# Patient Record
Sex: FEMALE | Race: BLACK OR AFRICAN AMERICAN | Employment: FULL TIME | URBAN - METROPOLITAN AREA
[De-identification: names, ages, dates, MRNs, and addresses within clinical notes are randomized per-mention and may not be internally consistent; named-entity substitution may affect disease eponyms.]

---

## 2017-02-08 ENCOUNTER — OFFICE VISIT (OUTPATIENT)
Dept: FAMILY MEDICINE CLINIC | Age: 32
End: 2017-02-08

## 2017-02-08 VITALS
WEIGHT: 272 LBS | OXYGEN SATURATION: 98 % | TEMPERATURE: 97.9 F | RESPIRATION RATE: 16 BRPM | BODY MASS INDEX: 46.44 KG/M2 | SYSTOLIC BLOOD PRESSURE: 150 MMHG | HEIGHT: 64 IN | DIASTOLIC BLOOD PRESSURE: 92 MMHG

## 2017-02-08 DIAGNOSIS — E04.9 GOITER: ICD-10-CM

## 2017-02-08 DIAGNOSIS — I10 ESSENTIAL HYPERTENSION: ICD-10-CM

## 2017-02-08 DIAGNOSIS — R20.2 NUMBNESS AND TINGLING IN RIGHT HAND: ICD-10-CM

## 2017-02-08 DIAGNOSIS — R73.01 IFG (IMPAIRED FASTING GLUCOSE): ICD-10-CM

## 2017-02-08 DIAGNOSIS — E66.01 MORBID OBESITY, UNSPECIFIED OBESITY TYPE (HCC): Primary | ICD-10-CM

## 2017-02-08 DIAGNOSIS — R20.0 NUMBNESS AND TINGLING IN RIGHT HAND: ICD-10-CM

## 2017-02-08 DIAGNOSIS — E66.01 MORBID OBESITY, UNSPECIFIED OBESITY TYPE (HCC): ICD-10-CM

## 2017-02-08 DIAGNOSIS — M54.2 NECK PAIN: ICD-10-CM

## 2017-02-08 DIAGNOSIS — F41.9 ANXIETY: ICD-10-CM

## 2017-02-08 RX ORDER — LORAZEPAM 0.5 MG/1
0.5 TABLET ORAL
Qty: 15 TAB | Refills: 0 | Status: SHIPPED | OUTPATIENT
Start: 2017-02-08 | End: 2017-05-10 | Stop reason: SDUPTHER

## 2017-02-08 NOTE — PROGRESS NOTES
Patient is here to re-establish care. She is having trouble sleeping.  She also states she has blurry vision

## 2017-02-08 NOTE — PROGRESS NOTES
Talita Bone is a 36 y.o.  female and presents with     Chief Complaint   Patient presents with    Numbness    Obesity    Hypertension    Anxiety     Pt delivered 2 years back . Pt works as . Pt has been having numbness in her rt hand that goes all the way upto her neck. No neck injuries. Pt had HTN during pregnancy and was concerned as high risk. Pt does have anxiety and is unable to sleep at night. Pt is not breast feeding/  Pt does gets headaches. Pt does feel nauseous and the light bothers her. No past medical history on file. Past Surgical History   Procedure Laterality Date    Hx other surgical  2012     right lung thotocentesis    Hx  section       2 done. one in  another in      Current Outpatient Prescriptions   Medication Sig    LORazepam (ATIVAN) 0.5 mg tablet Take 1 Tab by mouth nightly. Max Daily Amount: 0.5 mg.  promethazine (PHENERGAN) 25 mg tablet Take 25 mg by mouth every six (6) hours as needed for Nausea.  prenatal multivit-ca-min-fe-fa tab Take  by mouth. No current facility-administered medications for this visit. Health Maintenance   Topic Date Due    DTaP/Tdap/Td series (1 - Tdap) 2008    PAP AKA CERVICAL CYTOLOGY  2008    INFLUENZA AGE 9 TO ADULT  2016     Immunization History   Administered Date(s) Administered    TB Skin Test (PPD) Intradermal 2014     No LMP recorded. Allergies and Intolerances:   No Known Allergies    Family History:   Family History   Problem Relation Age of Onset    Hypertension Mother     Asthma Maternal Aunt     Hypertension Maternal Grandmother     Stroke Maternal Grandmother        Social History:   She  reports that she has never smoked. She does not have any smokeless tobacco history on file. She  reports that she drinks alcohol.             Review of Systems:   General: negative for - chills, fatigue, fever, weight change  Psych: negative for - anxiety, depression, irritability or mood swings  ENT: negative for - headaches, hearing change, nasal congestion, oral lesions, sneezing or sore throat  Heme/ Lymph: negative for - bleeding problems, bruising, pallor or swollen lymph nodes  Endo: negative for - hot flashes, polydipsia/polyuria or temperature intolerance  Resp: negative for - cough, shortness of breath or wheezing  CV: negative for - chest pain, edema or palpitations  GI: negative for - abdominal pain, change in bowel habits, constipation, diarrhea or nausea/vomiting  : negative for - dysuria, hematuria, incontinence, pelvic pain or vulvar/vaginal symptoms  MSK: negative for - joint pain, joint swelling or muscle pain  Neuro: negative for - confusion, headaches, seizures or weakness  Derm: negative for - dry skin, hair changes, rash or skin lesion changes          Physical:   Vitals:   Vitals:    02/08/17 1554   BP: (!) 150/92   Resp: 16   Temp: 97.9 °F (36.6 °C)   TempSrc: Oral   SpO2: 98%   Weight: 272 lb (123.4 kg)   Height: 5' 4\" (1.626 m)           Exam:   HEENT- atraumatic,normocephalic, awake, oriented, well nourished,obese  Neck - supple,no enlarged lymph nodes, no JVD, mild thyromegaly  Chest- CTA, no rhonchi, no crackles  Heart- rrr, no murmurs / gallop/rub  Abdomen- soft,BS+,NT, no hepatosplenomegaly  Ext - no c/c/edema ,phalens sign pos rt side  Neuro- no focal deficits. Power 5/5 all extremities  Skin - warm,dry, no obvious rashes.           Review of Data:   LABS:   Lab Results   Component Value Date/Time    WBC 10.3 05/10/2014 08:50 PM    HGB 12.0 05/10/2014 08:50 PM    HCT 36.8 05/10/2014 08:50 PM    PLATELET 262 84/02/2929 08:50 PM     Lab Results   Component Value Date/Time    Sodium 137 05/10/2014 08:50 PM    Potassium 3.9 05/10/2014 08:50 PM    Chloride 106 05/10/2014 08:50 PM    CO2 24 05/10/2014 08:50 PM    Glucose 94 05/10/2014 08:50 PM    BUN 10 05/10/2014 08:50 PM    Creatinine 0.82 05/10/2014 08:50 PM     Lab Results Component Value Date/Time    Cholesterol, total 161 08/26/2010 03:16 PM    HDL Cholesterol 48 08/26/2010 03:16 PM    LDL, calculated 84 08/26/2010 03:16 PM    Triglyceride 145 08/26/2010 03:16 PM     No results found for: GPT        Impression / Plan:        ICD-10-CM ICD-9-CM    1. Morbid obesity, unspecified obesity type ([facility] W31.51 390.24 METABOLIC PANEL, COMPREHENSIVE      LIPID PANEL      CBC WITH AUTOMATED DIFF   2. Neck pain M54.2 723.1 XR SPINE CERV PA LAT ODONT 3 V MAX      URIC ACID   3. Essential hypertension I10 401.9    4. Goiter E04.9 240.9 TSH 3RD GENERATION   5. Numbness and tingling in right hand R20.2 782.0    6. Anxiety F41.9 300.00 LORazepam (ATIVAN) 0.5 mg tablet   7. IFG (impaired fasting glucose) R73.01 790.21 HEMOGLOBIN A1C WITH EAG      METABOLIC PANEL, COMPREHENSIVE         Explained to patient risk benefits of the medications. Advised patient to stop meds if having any side effects. Pt verbalized understanding of the instructions. I have discussed the diagnosis with the patient and the intended plan as seen in the above orders. The patient has received an after-visit summary and questions were answered concerning future plans. I have discussed medication side effects and warnings with the patient as well. I have reviewed the plan of care with the patient, accepted their input and they are in agreement with the treatment goals. Reviewed plan of care. Patient has provided input and agrees with goals.     Follow-up Disposition: Not on Clyde Bond MD

## 2017-02-08 NOTE — MR AVS SNAPSHOT
Visit Information Date & Time Provider Department Dept. Phone Encounter #  
 2/8/2017  3:45 PM Juan M Christensen, [address] [facility]  Follow-up Instructions Return in about 2 weeks (around 2/22/2017). Upcoming Health Maintenance Date Due DTaP/Tdap/Td series (1 - Tdap) 11/23/2008 PAP AKA CERVICAL CYTOLOGY 11/23/2008 INFLUENZA AGE 9 TO ADULT 8/1/2016 Allergies as of 2/8/2017  Review Complete On: 2/8/2017 By: Juan M Christensen MD  
 No Known Allergies Current Immunizations  Reviewed on 3/31/2014 Name Date  
 TB Skin Test (PPD) Intradermal 3/28/2014 Not reviewed this visit You Were Diagnosed With   
  
 Codes Comments Morbid obesity, unspecified obesity type ([facility]    -  Primary ICD-10-CM: E66.01 
ICD-9-CM: 278.01 Neck pain     ICD-10-CM: M54.2 ICD-9-CM: 723.1 Essential hypertension     ICD-10-CM: I10 
ICD-9-CM: 401.9 Goiter     ICD-10-CM: E04.9 ICD-9-CM: 240.9 Numbness and tingling in right hand     ICD-10-CM: R20.2 ICD-9-CM: 782.0 Anxiety     ICD-10-CM: F41.9 ICD-9-CM: 300.00 IFG (impaired fasting glucose)     ICD-10-CM: R73.01 
ICD-9-CM: 790.21 Vitals BP Temp Resp Height(growth percentile) Weight(growth percentile) SpO2  
 (!) 150/92 97.9 °F (36.6 °C) (Oral) 16 5' 4\" (1.626 m) 272 lb (123.4 kg) 98% BMI OB Status Smoking Status 46.69 kg/m2 Having regular periods Never Smoker Vitals History BMI and BSA Data Body Mass Index Body Surface Area  
 46.69 kg/m 2 2.36 m 2 Preferred Pharmacy Pharmacy Name Phone [id number] [address], [address] [place] [address] [phone] Your Updated Medication List  
  
   
This list is accurate as of: 2/8/17  4:19 PM.  Always use your most recent med list.  
  
  
  
  
 LORazepam 0.5 mg tablet Commonly known as:  ATIVAN Take 1 Tab by mouth nightly.  Max Daily Amount: 0.5 mg.  
  
 prenatal multivit-ca-min-fe-fa Tab Take  by mouth.  
  
 promethazine 25 mg tablet Commonly known as:  PHENERGAN Take 25 mg by mouth every six (6) hours as needed for Nausea. Prescriptions Printed Refills LORazepam (ATIVAN) 0.5 mg tablet 0 Sig: Take 1 Tab by mouth nightly. Max Daily Amount: 0.5 mg.  
 Class: Print Route: Oral  
  
Follow-up Instructions Return in about 2 weeks (around 2/22/2017). To-Do List   
 02/08/2017 Lab:  CBC WITH AUTOMATED DIFF   
  
 02/08/2017 Lab:  HEMOGLOBIN A1C WITH EAG   
  
 02/08/2017 Lab:  LIPID PANEL   
  
 02/08/2017 Lab:  METABOLIC PANEL, COMPREHENSIVE   
  
 02/08/2017 Lab:  TSH 3RD GENERATION   
  
 02/08/2017 Lab:  URIC ACID   
  
 02/08/2017 Imaging:  XR SPINE CERV PA LAT ODONT 3 V MAX Introducing [facility] & HEALTH SERVICES! Ellis Bazzi introduces SPEEDELO patient portal. Now you can access parts of your medical record, email your doctor's office, and request medication refills online. 1. In your internet browser, go to [email or web address] 2. Click on the First Time User? Click Here link in the Sign In box. You will see the New Member Sign Up page. 3. Enter your SPEEDELO Access Code exactly as it appears below. You will not need to use this code after youve completed the sign-up process. If you do not sign up before the expiration date, you must request a new code. · SPEEDELO Access Code: [email or web address] Expires: 5/9/2017  4:19 PM 
 
4. Enter the last four digits of your Social Security Number (xxxx) and Date of Birth (mm/dd/yyyy) as indicated and click Submit. You will be taken to the next sign-up page. 5. Create a SPEEDELO ID. This will be your SPEEDELO login ID and cannot be changed, so think of one that is secure and easy to remember. 6. Create a YOGITECHt password. You can change your password at any time. 7. Enter your Password Reset Question and Answer. This can be used at a later time if you forget your password. 8. Enter your e-mail address. You will receive e-mail notification when new information is available in [address] 9. Click Sign Up. You can now view and download portions of your medical record. 10. Click the Download Summary menu link to download a portable copy of your medical information. If you have questions, please visit the Frequently Asked Questions section of the Applitools website. Remember, Applitools is NOT to be used for urgent needs. For medical emergencies, dial 911. Now available from your iPhone and Android! Please provide this summary of care documentation to your next provider. Your primary care clinician is listed as Talha Bates. If you have any questions after today's visit, please call [phone].

## 2017-02-17 ENCOUNTER — HOSPITAL ENCOUNTER (OUTPATIENT)
Dept: LAB | Age: 32
Discharge: HOME | End: 2017-02-17

## 2017-02-17 PROCEDURE — 99001 SPECIMEN HANDLING PT-LAB: CPT | Performed by: INTERNAL MEDICINE

## 2017-02-18 LAB
ALBUMIN SERPL-MCNC: 4.2 G/DL (ref 3.5–5.5)
ALBUMIN/GLOB SERPL: 1.4 {RATIO} (ref 1.1–2.5)
ALP SERPL-CCNC: 69 IU/L (ref 39–117)
ALT SERPL-CCNC: 17 IU/L (ref 0–32)
AST SERPL-CCNC: 16 IU/L (ref 0–40)
BASOPHILS # BLD AUTO: 0 X10E3/UL (ref 0–0.2)
BASOPHILS NFR BLD AUTO: 0 %
BILIRUB SERPL-MCNC: 0.4 MG/DL (ref 0–1.2)
BUN SERPL-MCNC: 8 MG/DL (ref 6–20)
BUN/CREAT SERPL: 11 (ref 8–20)
CALCIUM SERPL-MCNC: 9.3 MG/DL (ref 8.7–10.2)
CHLORIDE SERPL-SCNC: 100 MMOL/L (ref 96–106)
CHOLEST SERPL-MCNC: 152 MG/DL (ref 100–199)
CO2 SERPL-SCNC: 22 MMOL/L (ref 18–29)
CREAT SERPL-MCNC: 0.74 MG/DL (ref 0.57–1)
EOSINOPHIL # BLD AUTO: 0.1 X10E3/UL (ref 0–0.4)
EOSINOPHIL NFR BLD AUTO: 1 %
ERYTHROCYTE [DISTWIDTH] IN BLOOD BY AUTOMATED COUNT: 13.8 % (ref 12.3–15.4)
EST. AVERAGE GLUCOSE BLD GHB EST-MCNC: 120 MG/DL
GLOBULIN SER CALC-MCNC: 3.1 G/DL (ref 1.5–4.5)
GLUCOSE SERPL-MCNC: 81 MG/DL (ref 65–99)
HBA1C MFR BLD: 5.8 % (ref 4.8–5.6)
HCT VFR BLD AUTO: 38.3 % (ref 34–46.6)
HDLC SERPL-MCNC: 43 MG/DL
HGB BLD-MCNC: 12.3 G/DL (ref 11.1–15.9)
IMM GRANULOCYTES # BLD: 0 X10E3/UL (ref 0–0.1)
IMM GRANULOCYTES NFR BLD: 0 %
INTERPRETATION, 910389: NORMAL
LDLC SERPL CALC-MCNC: 95 MG/DL (ref 0–99)
LYMPHOCYTES # BLD AUTO: 2.6 X10E3/UL (ref 0.7–3.1)
LYMPHOCYTES NFR BLD AUTO: 39 %
MCH RBC QN AUTO: 28.1 PG (ref 26.6–33)
MCHC RBC AUTO-ENTMCNC: 32.1 G/DL (ref 31.5–35.7)
MCV RBC AUTO: 88 FL (ref 79–97)
MONOCYTES # BLD AUTO: 0.4 X10E3/UL (ref 0.1–0.9)
MONOCYTES NFR BLD AUTO: 5 %
NEUTROPHILS # BLD AUTO: 3.6 X10E3/UL (ref 1.4–7)
NEUTROPHILS NFR BLD AUTO: 55 %
PLATELET # BLD AUTO: 250 X10E3/UL (ref 150–379)
POTASSIUM SERPL-SCNC: 3.9 MMOL/L (ref 3.5–5.2)
PROT SERPL-MCNC: 7.3 G/DL (ref 6–8.5)
RBC # BLD AUTO: 4.37 X10E6/UL (ref 3.77–5.28)
SODIUM SERPL-SCNC: 142 MMOL/L (ref 134–144)
TRIGL SERPL-MCNC: 71 MG/DL (ref 0–149)
TSH SERPL DL<=0.005 MIU/L-ACNC: 0.96 UIU/ML (ref 0.45–4.5)
URATE SERPL-MCNC: 5.2 MG/DL (ref 2.5–7.1)
VLDLC SERPL CALC-MCNC: 14 MG/DL (ref 5–40)
WBC # BLD AUTO: 6.7 X10E3/UL (ref 3.4–10.8)

## 2017-02-22 ENCOUNTER — HOSPITAL ENCOUNTER (OUTPATIENT)
Dept: GENERAL RADIOLOGY | Age: 32
Discharge: HOME | End: 2017-02-22
Payer: MEDICAID

## 2017-02-22 ENCOUNTER — OFFICE VISIT (OUTPATIENT)
Dept: FAMILY MEDICINE CLINIC | Age: 32
End: 2017-02-22

## 2017-02-22 VITALS
BODY MASS INDEX: 48.49 KG/M2 | SYSTOLIC BLOOD PRESSURE: 129 MMHG | RESPIRATION RATE: 17 BRPM | OXYGEN SATURATION: 98 % | DIASTOLIC BLOOD PRESSURE: 81 MMHG | WEIGHT: 284 LBS | HEIGHT: 64 IN | TEMPERATURE: 97.1 F | HEART RATE: 80 BPM

## 2017-02-22 DIAGNOSIS — R73.01 IFG (IMPAIRED FASTING GLUCOSE): Primary | ICD-10-CM

## 2017-02-22 DIAGNOSIS — R20.0 NUMBNESS AND TINGLING IN RIGHT HAND: ICD-10-CM

## 2017-02-22 DIAGNOSIS — M54.2 NECK PAIN: ICD-10-CM

## 2017-02-22 DIAGNOSIS — E66.01 MORBID OBESITY DUE TO EXCESS CALORIES (HCC): ICD-10-CM

## 2017-02-22 DIAGNOSIS — R20.2 NUMBNESS AND TINGLING IN RIGHT HAND: ICD-10-CM

## 2017-02-22 PROCEDURE — 72040 X-RAY EXAM NECK SPINE 2-3 VW: CPT

## 2017-02-22 RX ORDER — METFORMIN HYDROCHLORIDE 500 MG/1
500 TABLET ORAL 2 TIMES DAILY WITH MEALS
Qty: 60 TAB | Refills: 3 | Status: SHIPPED | OUTPATIENT
Start: 2017-02-22 | End: 2021-02-01

## 2017-02-22 NOTE — MR AVS SNAPSHOT
Visit Information Date & Time Provider Department Dept. Phone Encounter #  
 2/22/2017  1:30 PM Destinee Castillo, [address] [facility] [phone] Follow-up Instructions Return in about 3 months (around 5/22/2017). Upcoming Health Maintenance Date Due  
 PAP AKA CERVICAL CYTOLOGY 11/23/2008 DTaP/Tdap/Td series (2 - Td) 2/22/2027 Allergies as of 2/22/2017  Review Complete On: 2/22/2017 By: Destinee Castillo MD  
 No Known Allergies Current Immunizations  Reviewed on 3/31/2014 Name Date  
 TB Skin Test (PPD) Intradermal 3/28/2014 Not reviewed this visit You Were Diagnosed With   
  
 Codes Comments IFG (impaired fasting glucose)    -  Primary ICD-10-CM: R73.01 
ICD-9-CM: 790.21 Morbid obesity due to excess calories (HCC)     ICD-10-CM: E66.01 
ICD-9-CM: 278.01 Numbness and tingling in right hand     ICD-10-CM: R20.2 ICD-9-CM: 934. 0 Vitals BP  
  
  
  
  
  
 129/81 (BP 1 Location: Left arm, BP Patient Position: Sitting) Vitals History BMI and BSA Data Body Mass Index Body Surface Area 48.75 kg/m 2 2.41 m 2 Preferred Pharmacy Pharmacy Name Phone [id number] [address] [phone] Your Updated Medication List  
  
   
This list is accurate as of: 2/22/17  2:16 PM.  Always use your most recent med list.  
  
  
  
  
 LORazepam 0.5 mg tablet Commonly known as:  ATIVAN Take 1 Tab by mouth nightly. Max Daily Amount: 0.5 mg.  
  
 metFORMIN 500 mg tablet Commonly known as:  GLUCOPHAGE Take 1 Tab by mouth two (2) times daily (with meals). prenatal multivit-ca-min-fe-fa Tab Take  by mouth.  
  
 promethazine 25 mg tablet Commonly known as:  PHENERGAN Take 25 mg by mouth every six (6) hours as needed for Nausea. Prescriptions Sent to Pharmacy  Refills  
 metFORMIN (GLUCOPHAGE) 500 mg tablet 3  
 Sig: Take 1 Tab by mouth two (2) times daily (with meals). Class: Normal  
 Pharmacy: [facility], [address] [place] [address]  #: [phone] Route: Oral  
  
We Performed the Following REFERRAL TO NEUROLOGY [HUM71 Custom] Follow-up Instructions Return in about 3 months (around 5/22/2017). Referral Information Referral ID Referred By Referred To  
  
 [id number] [facility] Not Available Visits Status Start Date End Date 1 New Request 2/22/17 2/22/18 If your referral has a status of pending review or denied, additional information will be sent to support the outcome of this decision. Introducing \[facility]\"" & HEALTH SERVICES! Edmundo Norris introduces CareTree patient portal. Now you can access parts of your medical record, email your doctor's office, and request medication refills online. 1. In your internet browser, go to [email or web address] 2. Click on the First Time User? Click Here link in the Sign In box. You will see the New Member Sign Up page. 3. Enter your CareTree Access Code exactly as it appears below. You will not need to use this code after youve completed the sign-up process. If you do not sign up before the expiration date, you must request a new code. · CareTree Access Code: [email or web address] Expires: 5/9/2017  4:19 PM 
 
4. Enter the last four digits of your Social Security Number (xxxx) and Date of Birth (mm/dd/yyyy) as indicated and click Submit. You will be taken to the next sign-up page. 5. Create a Alohar Mobilet ID. This will be your Alohar Mobilet login ID and cannot be changed, so think of one that is secure and easy to remember. 6. Create a Alohar Mobilet password. You can change your password at any time. 7. Enter your Password Reset Question and Answer. This can be used at a later time if you forget your password. 8. Enter your e-mail address.  You will receive e-mail notification when new information is available in [employer]. 9. Click Sign Up. You can now view and download portions of your medical record. 10. Click the Download Summary menu link to download a portable copy of your medical information. If you have questions, please visit the Frequently Asked Questions section of the [employer] website. Remember, [employer] is NOT to be used for urgent needs. For medical emergencies, dial 911. Now available from your iPhone and Android! Please provide this summary of care documentation to your next provider. Your primary care clinician is listed as Ray Desai. If you have any questions after today's visit, please call [phone].

## 2017-02-22 NOTE — PROGRESS NOTES
Patient presents to clinic for right sided hand numbness. Advance Directive:    1. Do you have an advance directive in place? Patient Reply:     2. If not, would you like material regarding how to put one in place? Patient Reply:      Coordination of Care:    1. Have you been to the ER, urgent care clinic since your last visit? Hospitalized since your last visit? No    2. Have you seen or consulted any other health care providers outside of the [address] [place] [address] since your last visit? Include any pap smears or colon screening. No    Depression Screening completed. Learning Assessment completed. Abuse Screening completed. Health Maintenance reviewed and discussed per provider.

## 2017-02-22 NOTE — PROGRESS NOTES
Nicole Aranda is a 36 y.o.  female and presents with     Chief Complaint   Patient presents with    Numbness    Obesity    Anxiety     Pt continues to have pain in her rt hand and also numbness. Pt is trying to loose wt. Pt took ativan and it helped her with anxiety. No past medical history on file. Past Surgical History:   Procedure Laterality Date    HX  SECTION      2 done. one in  another in     HX OTHER SURGICAL  2012    right lung thotocentesis     Current Outpatient Prescriptions   Medication Sig    metFORMIN (GLUCOPHAGE) 500 mg tablet Take 1 Tab by mouth two (2) times daily (with meals).  LORazepam (ATIVAN) 0.5 mg tablet Take 1 Tab by mouth nightly. Max Daily Amount: 0.5 mg.  promethazine (PHENERGAN) 25 mg tablet Take 25 mg by mouth every six (6) hours as needed for Nausea.  prenatal multivit-ca-min-fe-fa tab Take  by mouth. No current facility-administered medications for this visit. Health Maintenance   Topic Date Due    PAP AKA CERVICAL CYTOLOGY  2008    DTaP/Tdap/Td series (2 - Td) 2027    INFLUENZA AGE 9 TO ADULT  Addressed     Immunization History   Administered Date(s) Administered    TB Skin Test (PPD) Intradermal 2014     No LMP recorded. Allergies and Intolerances:   No Known Allergies    Family History:   Family History   Problem Relation Age of Onset    Hypertension Mother     Asthma Maternal Aunt     Hypertension Maternal Grandmother     Stroke Maternal Grandmother        Social History:   She  reports that she has never smoked. She does not have any smokeless tobacco history on file. She  reports that she drinks alcohol.             Review of Systems:   General: negative for - chills, fatigue, fever, weight change  Psych: pos for anxiety  ENT: negative for - headaches, hearing change, nasal congestion, oral lesions, sneezing or sore throat  Heme/ Lymph: negative for - bleeding problems, bruising, pallor or swollen lymph nodes  Endo: negative for - hot flashes, polydipsia/polyuria or temperature intolerance  Resp: negative for - cough, shortness of breath or wheezing  CV: negative for - chest pain, edema or palpitations  GI: negative for - abdominal pain, change in bowel habits, constipation, diarrhea or nausea/vomiting  : negative for - dysuria, hematuria, incontinence, pelvic pain or vulvar/vaginal symptoms  MSK: negative for - joint pain, joint swelling or muscle pain  Neuro: negative for - confusion, headaches, seizures or weakness  Derm: negative for - dry skin, hair changes, rash or skin lesion changes          Physical:   Vitals:   Vitals:    02/22/17 1349   BP: 129/81   Pulse: 80   Resp: 17   Temp: 97.1 °F (36.2 °C)   TempSrc: Oral   SpO2: 98%   Weight: 284 lb (128.8 kg)   Height: 5' 4\" (1.626 m)           Exam:   HEENT- atraumatic,normocephalic, awake, oriented, well nourished,obese  Neck - supple,no enlarged lymph nodes, no JVD, no thyromegaly  Chest- CTA, no rhonchi, no crackles  Heart- rrr, no murmurs / gallop/rub  Abdomen- soft,BS+,NT, no hepatosplenomegaly  Ext - no c/c/edema   Neuro- no focal deficits. Power 5/5 all extremities  Skin - warm,dry, no obvious rashes.           Review of Data:   LABS:   Lab Results   Component Value Date/Time    WBC 6.7 02/17/2017 12:00 AM    HGB 12.3 02/17/2017 12:00 AM    HCT 38.3 02/17/2017 12:00 AM    PLATELET 453 11/11/4183 12:00 AM     Lab Results   Component Value Date/Time    Sodium 142 02/17/2017 12:00 AM    Potassium 3.9 02/17/2017 12:00 AM    Chloride 100 02/17/2017 12:00 AM    CO2 22 02/17/2017 12:00 AM    Glucose 81 02/17/2017 12:00 AM    BUN 8 02/17/2017 12:00 AM    Creatinine 0.74 02/17/2017 12:00 AM     Lab Results   Component Value Date/Time    Cholesterol, total 152 02/17/2017 12:00 AM    HDL Cholesterol 43 02/17/2017 12:00 AM    LDL, calculated 95 02/17/2017 12:00 AM    Triglyceride 71 02/17/2017 12:00 AM     No results found for: GPT        Impression / Plan:        ICD-10-CM ICD-9-CM    1. IFG (impaired fasting glucose) R73.01 790.21 metFORMIN (GLUCOPHAGE) 500 mg tablet   2. Morbid obesity due to excess calories (HCC) E66.01 278.01 metFORMIN (GLUCOPHAGE) 500 mg tablet   3. Numbness and tingling in right hand R20.2 782.0 REFERRAL TO NEUROLOGY     Diet ,exercise. Explained to patient risk benefits of the medications. Advised patient to stop meds if having any side effects. Pt verbalized understanding of the instructions. I have discussed the diagnosis with the patient and the intended plan as seen in the above orders. The patient has received an after-visit summary and questions were answered concerning future plans. I have discussed medication side effects and warnings with the patient as well. I have reviewed the plan of care with the patient, accepted their input and they are in agreement with the treatment goals. Reviewed plan of care. Patient has provided input and agrees with goals.     Follow-up Disposition: Not on Jamison Armstrong MD

## 2017-05-10 DIAGNOSIS — F41.9 ANXIETY: ICD-10-CM

## 2017-05-11 RX ORDER — LORAZEPAM 0.5 MG/1
0.5 TABLET ORAL
Qty: 15 TAB | Refills: 0 | Status: SHIPPED | OUTPATIENT
Start: 2017-05-11 | End: 2021-02-01

## 2017-05-11 NOTE — TELEPHONE ENCOUNTER
From: Janie Chappell  To: Jennifer Barajas MD  Sent: 5/10/2017 4:33 PM EDT  Subject: Medication Renewal Request    Original authorizing provider: MD Janie Clay would like a refill of the following medications:  LORazepam (ATIVAN) 0.5 mg tablet Jennifer Barajas MD]    [id number] [address]:

## 2017-05-18 ENCOUNTER — OFFICE VISIT (OUTPATIENT)
Dept: FAMILY MEDICINE CLINIC | Age: 32
End: 2017-05-18

## 2017-05-18 ENCOUNTER — DOCUMENTATION ONLY (OUTPATIENT)
Dept: FAMILY MEDICINE CLINIC | Age: 32
End: 2017-05-18

## 2017-05-18 VITALS
OXYGEN SATURATION: 99 % | WEIGHT: 287 LBS | RESPIRATION RATE: 16 BRPM | SYSTOLIC BLOOD PRESSURE: 163 MMHG | HEIGHT: 64 IN | HEART RATE: 82 BPM | TEMPERATURE: 97.1 F | BODY MASS INDEX: 49 KG/M2 | DIASTOLIC BLOOD PRESSURE: 100 MMHG

## 2017-05-18 DIAGNOSIS — R22.1 NECK FULLNESS: ICD-10-CM

## 2017-05-18 DIAGNOSIS — F41.9 ANXIETY: ICD-10-CM

## 2017-05-18 DIAGNOSIS — R11.0 NAUSEA: ICD-10-CM

## 2017-05-18 DIAGNOSIS — I10 ESSENTIAL HYPERTENSION: ICD-10-CM

## 2017-05-18 DIAGNOSIS — R30.0 DYSURIA: Primary | ICD-10-CM

## 2017-05-18 RX ORDER — LOSARTAN POTASSIUM 50 MG/1
50 TABLET ORAL DAILY
Qty: 30 TAB | Refills: 3 | Status: SHIPPED | OUTPATIENT
Start: 2017-05-18 | End: 2021-02-01

## 2017-05-18 RX ORDER — ONDANSETRON 4 MG/1
4 TABLET, ORALLY DISINTEGRATING ORAL
Qty: 30 TAB | Refills: 0 | Status: SHIPPED | OUTPATIENT
Start: 2017-05-18 | End: 2021-02-01

## 2017-05-18 RX ORDER — NITROFURANTOIN (MACROCRYSTALS) 100 MG/1
100 CAPSULE ORAL 2 TIMES DAILY
Qty: 14 CAP | Refills: 0 | Status: SHIPPED | OUTPATIENT
Start: 2017-05-18 | End: 2017-05-25

## 2017-05-18 NOTE — PROGRESS NOTES
1. Have you been to the ER, urgent care clinic since your last visit? Hospitalized since your last visit? 5/12/17 [facility]- chest pain SOB    2. Have you seen or consulted any other health care providers outside of the [address] [place] [address] since your last visit? Include any pap smears or colon screening.  No

## 2017-05-18 NOTE — MR AVS SNAPSHOT
Visit Information Date & Time Provider Department Dept. Phone Encounter #  
 5/18/2017 10:30 AM Migel Sunshine, [facility] [phone] [id number] Follow-up Instructions Return in about 2 weeks (around 6/1/2017). Upcoming Health Maintenance Date Due  
 PAP AKA CERVICAL CYTOLOGY 11/23/2008 INFLUENZA AGE 9 TO ADULT 8/1/2017 DTaP/Tdap/Td series (2 - Td) 2/22/2027 Allergies as of 5/18/2017  Review Complete On: 5/18/2017 By: Migel Sunshine MD  
 No Known Allergies Current Immunizations  Reviewed on 3/31/2014 Name Date  
 TB Skin Test (PPD) Intradermal 3/28/2014 Not reviewed this visit You Were Diagnosed With   
  
 Codes Comments Dysuria    -  Primary ICD-10-CM: R30.0 ICD-9-CM: 788.1 Essential hypertension     ICD-10-CM: I10 
ICD-9-CM: 401.9 Neck fullness     ICD-10-CM: R22.1 ICD-9-CM: 784.2 Nausea     ICD-10-CM: R11.0 ICD-9-CM: 787.02 Anxiety     ICD-10-CM: F41.9 ICD-9-CM: 300.00 Vitals BP Pulse Temp Resp Height(growth percentile) Weight(growth percentile) (!) 163/100 (BP 1 Location: Left arm, BP Patient Position: Sitting) 82 97.1 °F (36.2 °C) (Oral) 16 5' 4\" (1.626 m) 287 lb (130.2 kg) LMP SpO2 BMI OB Status Smoking Status 05/01/2017 (Approximate) 99% 49.26 kg/m2 Having regular periods Never Smoker Vitals History BMI and BSA Data Body Mass Index Body Surface Area  
 49.26 kg/m 2 2.42 m 2 Preferred Pharmacy Pharmacy Name Phone [phone], [address] [place] [phone] Your Updated Medication List  
  
   
This list is accurate as of: 5/18/17 11:11 AM.  Always use your most recent med list.  
  
  
  
  
 LORazepam 0.5 mg tablet Commonly known as:  ATIVAN Take 1 Tab by mouth nightly. Max Daily Amount: 0.5 mg.  
  
 losartan 50 mg tablet Commonly known as:  COZAAR Take 1 Tab by mouth daily. metFORMIN 500 mg tablet Commonly known as:  GLUCOPHAGE Take 1 Tab by mouth two (2) times daily (with meals). nitrofurantoin 100 mg capsule Commonly known as:  MACRODANTIN Take 1 Cap by mouth two (2) times a day for 7 days. ondansetron 4 mg disintegrating tablet Commonly known as:  ZOFRAN ODT Take 1 Tab by mouth every eight (8) hours as needed for Nausea. prenatal multivit-ca-min-fe-fa Tab Take  by mouth.  
  
 promethazine 25 mg tablet Commonly known as:  PHENERGAN Take 25 mg by mouth every six (6) hours as needed for Nausea. Prescriptions Sent to Pharmacy Refills  
 nitrofurantoin (MACRODANTIN) 100 mg capsule 0 Sig: Take 1 Cap by mouth two (2) times a day for 7 days. Class: Normal  
 Pharmacy: [address] [place] [address] Ph #: [phone] Route: Oral  
 ondansetron (ZOFRAN ODT) 4 mg disintegrating tablet 0 Sig: Take 1 Tab by mouth every eight (8) hours as needed for Nausea. Class: Normal  
 Pharmacy: [address] [place] [address] Ph #: [phone] Route: Oral  
 losartan (COZAAR) 50 mg tablet 3 Sig: Take 1 Tab by mouth daily. Class: Normal  
 Pharmacy: [address] [place] [address],[facility] Ph #: [phone] Route: Oral  
  
Follow-up Instructions Return in about 2 weeks (around 6/1/2017). To-Do List   
 05/18/2017 Imaging:  US THYROID/PARATHYROID/SOFT TISS Introducing [facility]! Dear Casper Hartman: Thank you for requesting a [employer] account. Our records indicate that you already have an active [employer] account. You can access your account anytime at [email or web address] Did you know that you can access your hospital and ER discharge instructions at any time in [employer]? You can also review all of your test results from your hospital stay or ER visit. Additional Information If you have questions, please visit the Frequently Asked Questions section of the [employer] website at [email or web address] Remember, [employer] is NOT to be used for urgent needs. For medical emergencies, dial 911. Now available from your iPhone and Android! Please provide this summary of care documentation to your next provider. Your primary care clinician is listed as [phone]. If you have any questions after today's visit, please call [phone].

## 2017-05-18 NOTE — LETTER
5/18/2017 Janie Chappell [address] [address] [id number] Dear Ms. Janie Chappell, We had an appointment reserved for you 5/16/2017 and were concerned when you did not show or call within 24 hours to cancel the appointment. Our policy is to call patients two days prior to their appointment to remind them of the date and time. We perform these calls as a courtesy to our patients and to allow us the opportunity to rebook the time slot should the appointment not be necessary. Recognizing that everyones time is valuable and that appointment time is limited, we ask that you provide 24 hours notice if you are unable to keep your appointment. Please call us at your earliest convenience to reschedule your appointment as your provider felt it was important to see you. Thank you for your anticipated cooperation. The scheduling staff: 
 
[address] [place] [address],[address] [place] [id number] 
[phone]

## 2017-05-18 NOTE — PROGRESS NOTES
Jerilyn Read is a 36 y.o.  female and presents with     Chief Complaint   Patient presents with    Hypertension    Dysuria    Anxiety    Nausea     Pt  Recently went to ER with chest pressure. Work up was neg for cardiac etiology , pt was sent home. Pt does feel tired. Pt does have FH for hTN. During office visits sometimes her blood pressure is high. She does have componnent of anxiety. She has mild nausea and frequent urination. Pt did start taking the metformin 2  Weeks back and since then has had nausea. Does not drink or smoke. Likes spicy food      History reviewed. No pertinent past medical history. Past Surgical History:   Procedure Laterality Date    HX  SECTION      2 done. one in  another in     HX OTHER SURGICAL  2012    right lung thotocentesis     Current Outpatient Prescriptions   Medication Sig    nitrofurantoin (MACRODANTIN) 100 mg capsule Take 1 Cap by mouth two (2) times a day for 7 days.  ondansetron (ZOFRAN ODT) 4 mg disintegrating tablet Take 1 Tab by mouth every eight (8) hours as needed for Nausea.  losartan (COZAAR) 50 mg tablet Take 1 Tab by mouth daily.  LORazepam (ATIVAN) 0.5 mg tablet Take 1 Tab by mouth nightly. Max Daily Amount: 0.5 mg.    metFORMIN (GLUCOPHAGE) 500 mg tablet Take 1 Tab by mouth two (2) times daily (with meals).  promethazine (PHENERGAN) 25 mg tablet Take 25 mg by mouth every six (6) hours as needed for Nausea.  prenatal multivit-ca-min-fe-fa tab Take  by mouth. No current facility-administered medications for this visit. Health Maintenance   Topic Date Due    PAP AKA CERVICAL CYTOLOGY  2008    INFLUENZA AGE 9 TO ADULT  2017    DTaP/Tdap/Td series (2 - Td) 2027     Immunization History   Administered Date(s) Administered    TB Skin Test (PPD) Intradermal 2014     Patient's last menstrual period was 2017 (approximate).         Allergies and Intolerances:   No Known Allergies    Family History:   Family History   Problem Relation Age of Onset    Hypertension Mother     Asthma Maternal Aunt     Hypertension Maternal Grandmother     Stroke Maternal Grandmother        Social History:   She  reports that she has never smoked. She does not have any smokeless tobacco history on file. She  reports that she drinks alcohol. Review of Systems:   General: negative for - chills, pos for fatigue,  Psych: negative for - anxiety, depression, irritability or mood swings, pos for anxiety  ENT: negative for - headaches, hearing change, nasal congestion, oral lesions, sneezing or sore throat  Heme/ Lymph: negative for - bleeding problems, bruising, pallor or swollen lymph nodes  Endo: negative for - hot flashes, polydipsia/polyuria or temperature intolerance  Resp: negative for - cough, shortness of breath or wheezing  CV: negative for - chest pain, edema or palpitations  GI: negative for - abdominal pain, change in bowel habits, constipation, diarrhea or nausea/vomiting  : negative for - dysuria, hematuria, incontinence, pelvic pain or vulvar/vaginal symptoms, pos for frequent urination  MSK: negative for - joint pain, joint swelling or muscle pain  Neuro: negative for - confusion, headaches, seizures or weakness  Derm: negative for - dry skin, hair changes, rash or skin lesion changes          Physical:   Vitals:   Vitals:    05/18/17 1046   BP: (!) 163/100   Pulse: 82   Resp: 16   Temp: 97.1 °F (36.2 °C)   TempSrc: Oral   SpO2: 99%   Weight: 287 lb (130.2 kg)   Height: 5' 4\" (1.626 m)           Exam:   HEENT- atraumatic,normocephalic, awake, oriented, well nourished  Neck - supple,no enlarged lymph nodes, no JVD, mild thyromegaly  Chest- CTA, no rhonchi, no crackles  Heart- rrr, no murmurs / gallop/rub  Abdomen- soft,BS+,NT, no hepatosplenomegaly  Ext - no c/c/edema   Neuro- no focal deficits. Power 5/5 all extremities  Skin - warm,dry, no obvious rashes.   Back - rt sided lower back tenderness          Review of Data:   LABS:   Lab Results   Component Value Date/Time    WBC 6.7 02/17/2017 12:00 AM    HGB 12.3 02/17/2017 12:00 AM    HCT 38.3 02/17/2017 12:00 AM    PLATELET 254 42/10/0804 12:00 AM     Lab Results   Component Value Date/Time    Sodium 142 02/17/2017 12:00 AM    Potassium 3.9 02/17/2017 12:00 AM    Chloride 100 02/17/2017 12:00 AM    CO2 22 02/17/2017 12:00 AM    Glucose 81 02/17/2017 12:00 AM    BUN 8 02/17/2017 12:00 AM    Creatinine 0.74 02/17/2017 12:00 AM     Lab Results   Component Value Date/Time    Cholesterol, total 152 02/17/2017 12:00 AM    HDL Cholesterol 43 02/17/2017 12:00 AM    LDL, calculated 95 02/17/2017 12:00 AM    Triglyceride 71 02/17/2017 12:00 AM     No results found for: GPT        Impression / Plan:        ICD-10-CM ICD-9-CM    1. Dysuria R30.0 788.1 nitrofurantoin (MACRODANTIN) 100 mg capsule   2. Essential hypertension I10 401.9 losartan (COZAAR) 50 mg tablet   3. Neck fullness R22.1 784.2 US THYROID/PARATHYROID/SOFT TISS   4. Nausea R11.0 787.02 ondansetron (ZOFRAN ODT) 4 mg disintegrating tablet   5. Anxiety F41.9 300.00      Low salt intake    Avoid spicy , greasy food    Stop Metformin    MOnitor blood pressure    LMP - 2 weeks ago, pt does not think she is pregnant. Explained to patient risk benefits of the medications. Advised patient to stop meds if having any side effects. Pt verbalized understanding of the instructions. I have discussed the diagnosis with the patient and the intended plan as seen in the above orders. The patient has received an after-visit summary and questions were answered concerning future plans. I have discussed medication side effects and warnings with the patient as well. I have reviewed the plan of care with the patient, accepted their input and they are in agreement with the treatment goals. Reviewed plan of care. Patient has provided input and agrees with goals.     Follow-up Disposition: Not on Jerzy Frey MD

## 2017-08-21 ENCOUNTER — OFFICE VISIT (OUTPATIENT)
Dept: FAMILY MEDICINE CLINIC | Age: 32
End: 2017-08-21

## 2017-08-21 VITALS
HEIGHT: 64 IN | RESPIRATION RATE: 18 BRPM | HEART RATE: 101 BPM | SYSTOLIC BLOOD PRESSURE: 140 MMHG | DIASTOLIC BLOOD PRESSURE: 66 MMHG | OXYGEN SATURATION: 99 % | WEIGHT: 286.3 LBS | TEMPERATURE: 100.1 F | BODY MASS INDEX: 48.88 KG/M2

## 2017-08-21 DIAGNOSIS — J03.90 ACUTE TONSILLITIS, UNSPECIFIED ETIOLOGY: Primary | ICD-10-CM

## 2017-08-21 RX ORDER — CEFTRIAXONE 1 G/1
1 INJECTION, POWDER, FOR SOLUTION INTRAMUSCULAR; INTRAVENOUS ONCE
Qty: 1 VIAL | Refills: 0
Start: 2017-08-21 | End: 2017-08-21

## 2017-08-21 RX ORDER — ONDANSETRON 4 MG/1
4 TABLET, ORALLY DISINTEGRATING ORAL
Qty: 20 TAB | Refills: 0 | Status: SHIPPED | OUTPATIENT
Start: 2017-08-21 | End: 2022-08-23 | Stop reason: SDUPTHER

## 2017-08-21 RX ORDER — AMOXICILLIN 500 MG/1
500 CAPSULE ORAL 3 TIMES DAILY
Qty: 30 CAP | Refills: 0 | Status: SHIPPED | OUTPATIENT
Start: 2017-08-21 | End: 2017-08-31

## 2017-08-21 RX ORDER — AMOXICILLIN AND CLAVULANATE POTASSIUM 875; 125 MG/1; MG/1
1 TABLET, FILM COATED ORAL 2 TIMES DAILY
Qty: 14 TAB | Refills: 0 | Status: SHIPPED | OUTPATIENT
Start: 2017-08-21 | End: 2017-08-21

## 2017-08-21 NOTE — MR AVS SNAPSHOT
Visit Information Date & Time Provider Department Dept. Phone Encounter #  
 8/21/2017  3:00 PM Bhanu GUTIERREZ [address], [facility] [phone] [id number] Follow-up Instructions Return in about 3 days (around 8/24/2017). Your Appointments 8/21/2017  3:00 PM  
ROUTINE CARE with Bhanu Nails MD  
[facility] [facility]) Appt Note: Patient is saying that the left side of her face is sore and swollen. Patient is saying that there is pain when she turns her head [address] [place]  
  
   
 [address] [place] [address] [address] Upcoming Health Maintenance Date Due  
 PAP AKA CERVICAL CYTOLOGY 11/23/2008 INFLUENZA AGE 9 TO ADULT 8/1/2017 DTaP/Tdap/Td series (2 - Td) 2/22/2027 Allergies as of 8/21/2017  Review Complete On: 8/21/2017 By: Bhanu Nails MD  
 No Known Allergies Current Immunizations  Reviewed on 3/31/2014 Name Date  
 TB Skin Test (PPD) Intradermal 3/28/2014 Not reviewed this visit You Were Diagnosed With   
  
 Codes Comments Acute tonsillitis, unspecified etiology    -  Primary ICD-10-CM: J03.90 ICD-9-CM: 348 Vitals BP Pulse Temp Resp Height(growth percentile) Weight(growth percentile) 140/66 (!) 101 100.1 °F (37.8 °C) (Oral) 18 5' 4\" (1.626 m) 286 lb 4.8 oz (129.9 kg) LMP SpO2 BMI OB Status Smoking Status 07/20/2017 99% 49.14 kg/m2 Having regular periods Never Smoker Vitals History BMI and BSA Data Body Mass Index Body Surface Area  
 49.14 kg/m 2 2.42 m 2 Preferred Pharmacy Pharmacy Name Phone [phone] [phone], [address] [place] [phone] Your Updated Medication List  
  
   
This list is accurate as of: 8/21/17  1:52 PM.  Always use your most recent med list.  
  
  
  
  
 amoxicillin 500 mg capsule Commonly known as:  AMOXIL Take 1 Cap by mouth three (3) times daily for 10 days. cefTRIAXone 1 gram injection Commonly known as:  ROCEPHIN  
1 g by IntraMUSCular route once for 1 dose. LORazepam 0.5 mg tablet Commonly known as:  ATIVAN Take 1 Tab by mouth nightly. Max Daily Amount: 0.5 mg.  
  
 losartan 50 mg tablet Commonly known as:  COZAAR Take 1 Tab by mouth daily. metFORMIN 500 mg tablet Commonly known as:  GLUCOPHAGE Take 1 Tab by mouth two (2) times daily (with meals). * ondansetron 4 mg disintegrating tablet Commonly known as:  ZOFRAN ODT Take 1 Tab by mouth every eight (8) hours as needed for Nausea. * ondansetron 4 mg disintegrating tablet Commonly known as:  ZOFRAN ODT Take 1 Tab by mouth every eight (8) hours as needed for Nausea. prenatal multivit-ca-min-fe-fa Tab Take  by mouth. * Notice: This list has 2 medication(s) that are the same as other medications prescribed for you. Read the directions carefully, and ask your doctor or other care provider to review them with you. Prescriptions Sent to Pharmacy Refills  
 amoxicillin (AMOXIL) 500 mg capsule 0 Sig: Take 1 Cap by mouth three (3) times daily for 10 days. Class: Normal  
 Pharmacy: [address] [place] [address],[facility] Ph #: [phone] Route: Oral  
 ondansetron (ZOFRAN ODT) 4 mg disintegrating tablet 0 Sig: Take 1 Tab by mouth every eight (8) hours as needed for Nausea. Class: Normal  
 Pharmacy: [address] [place] [address],[facility] Ph #: [phone] Route: Oral  
  
We Performed the Following CEFTRIAXONE SODIUM INJECTION  MG [ [facility]] Comments:  
 Mix per protocol OK THER/PROPH/DIAG INJECTION, SUBCUT/IM [id number] CPT(R)] Follow-up Instructions Return in about 3 days (around 8/24/2017). Introducing [facility]! Dealencho Villareal Coma: Thank you for requesting a [employer] account. Our records indicate that you already have an active [employer] account. You can access your account anytime at [email or web address] Did you know that you can access your hospital and ER discharge instructions at any time in [employer]? You can also review all of your test results from your hospital stay or ER visit. Additional Information If you have questions, please visit the Frequently Asked Questions section of the [employer] website at [email or web address] Remember, [employer] is NOT to be used for urgent needs. For medical emergencies, dial 911. Now available from your iPhone and Android! Please provide this summary of care documentation to your next provider. Your primary care clinician is listed as Tammy Rodriguez. If you have any questions after today's visit, please call [phone].

## 2017-08-21 NOTE — PROGRESS NOTES
1. Have you been to the ER, urgent care clinic since your last visit? Hospitalized since your last visit? No    2. Have you seen or consulted any other health care providers outside of the [address] [place] [address] since your last visit? Include any pap smears or colon screening. No    Patient states she tested positive for ghonorrhea and syph and states she received a shot, but wants to be sure she is std free. She also complains of swollen lymph glands around the neck area for about a week,  She states shes been feeling fatigued.

## 2017-08-21 NOTE — PROGRESS NOTES
Moi Hernandez is a 36 y.o.  female and presents with     Chief Complaint   Patient presents with    Sore Throat    Ear Pain    Fatigue    Nausea     Pt has been having earache, headache, fatigue, sore throat  And nausea that started on Friday. 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 No vomiting, SOB, mild difficulty swallowing. No past medical history on file. Past Surgical History:   Procedure Laterality Date    HX  SECTION      2 done. one in  another in     HX OTHER SURGICAL  2012    right lung thotocentesis     Current Outpatient Prescriptions   Medication Sig    cefTRIAXone (ROCEPHIN) 1 gram injection 1 g by IntraMUSCular route once for 1 dose.  amoxicillin (AMOXIL) 500 mg capsule Take 1 Cap by mouth three (3) times daily for 10 days.  ondansetron (ZOFRAN ODT) 4 mg disintegrating tablet Take 1 Tab by mouth every eight (8) hours as needed for Nausea.  losartan (COZAAR) 50 mg tablet Take 1 Tab by mouth daily.  metFORMIN (GLUCOPHAGE) 500 mg tablet Take 1 Tab by mouth two (2) times daily (with meals).  prenatal multivit-ca-min-fe-fa tab Take  by mouth.  ondansetron (ZOFRAN ODT) 4 mg disintegrating tablet Take 1 Tab by mouth every eight (8) hours as needed for Nausea.  LORazepam (ATIVAN) 0.5 mg tablet Take 1 Tab by mouth nightly. Max Daily Amount: 0.5 mg. No current facility-administered medications for this visit. Health Maintenance   Topic Date Due    PAP AKA CERVICAL CYTOLOGY  2008    INFLUENZA AGE 9 TO ADULT  2017    DTaP/Tdap/Td series (2 - Td) 2027     Immunization History   Administered Date(s) Administered    TB Skin Test (PPD) Intradermal 2014     Patient's last menstrual period was 2017.         Allergies and Intolerances:   No Known Allergies    Family History:   Family History   Problem Relation Age of Onset    Hypertension Mother     Asthma Maternal Aunt     Hypertension Maternal Grandmother     Stroke Maternal Grandmother        Social History:   She  reports that she has never smoked. She has never used smokeless tobacco.  She  reports that she drinks alcohol. Review of Systems: as above  General: negative for - chills, fatigue, fever, weight change  Psych: negative for - anxiety, depression, irritability or mood swings  ENT: negative for - headaches, hearing change, nasal congestion, oral lesions, sneezing or sore throat  Heme/ Lymph: negative for - bleeding problems, bruising, pallor or swollen lymph nodes  Endo: negative for - hot flashes, polydipsia/polyuria or temperature intolerance  Resp: negative for - cough, shortness of breath or wheezing  CV: negative for - chest pain, edema or palpitations  GI: negative for - abdominal pain, change in bowel habits, constipation, diarrhea or nausea/vomiting  : negative for - dysuria, hematuria, incontinence, pelvic pain or vulvar/vaginal symptoms  MSK: negative for - joint pain, joint swelling or muscle pain  Neuro: negative for - confusion, headaches, seizures or weakness  Derm: negative for - dry skin, hair changes, rash or skin lesion changes          Physical:   Vitals:   Vitals:    08/21/17 1254 08/21/17 1300   BP: 140/69 140/66   Pulse: (!) 101    Resp: 18    Temp: 100.1 °F (37.8 °C)    TempSrc: Oral    SpO2: 99%    Weight: 286 lb 4.8 oz (129.9 kg)    Height: 5' 4\" (1.626 m)            Exam:   HEENT- atraumatic,normocephalic, awake, oriented, well nourished, enlarged tonsils bilateral, mild tenderness in the submental region both sides  Neck - supple,no enlarged lymph nodes, no JVD, no thyromegaly  Chest- CTA, no rhonchi, no crackles  Heart- rrr, no murmurs / gallop/rub  Abdomen- soft,BS+,NT, no hepatosplenomegaly  Ext - no c/c/edema   Neuro- no focal deficits. Power 5/5 all extremities  Skin - warm,dry, no obvious rashes.           Review of Data:   LABS:   Lab Results   Component Value Date/Time    WBC 6.7 02/17/2017 12:00 AM HGB 12.3 02/17/2017 12:00 AM    HCT 38.3 02/17/2017 12:00 AM    PLATELET 952 26/19/4630 12:00 AM     Lab Results   Component Value Date/Time    Sodium 142 02/17/2017 12:00 AM    Potassium 3.9 02/17/2017 12:00 AM    Chloride 100 02/17/2017 12:00 AM    CO2 22 02/17/2017 12:00 AM    Glucose 81 02/17/2017 12:00 AM    BUN 8 02/17/2017 12:00 AM    Creatinine 0.74 02/17/2017 12:00 AM     Lab Results   Component Value Date/Time    Cholesterol, total 152 02/17/2017 12:00 AM    HDL Cholesterol 43 02/17/2017 12:00 AM    LDL, calculated 95 02/17/2017 12:00 AM    Triglyceride 71 02/17/2017 12:00 AM     No results found for: GPT        Impression / Plan:        ICD-10-CM ICD-9-CM    1. Acute tonsillitis, unspecified etiology J03.90 463 cefTRIAXone (ROCEPHIN) 1 gram injection      CEFTRIAXONE SODIUM INJECTION  MG      DC THER/PROPH/DIAG INJECTION, SUBCUT/IM      DISCONTINUED: amoxicillin-clavulanate (AUGMENTIN) 875-125 mg per tablet     RTC or go to ER for worsening of symptoms. Explained to patient risk benefits of the medications. Advised patient to stop meds if having any side effects. Pt verbalized understanding of the instructions. I have discussed the diagnosis with the patient and the intended plan as seen in the above orders. The patient has received an after-visit summary and questions were answered concerning future plans. I have discussed medication side effects and warnings with the patient as well. I have reviewed the plan of care with the patient, accepted their input and they are in agreement with the treatment goals. Reviewed plan of care. Patient has provided input and agrees with goals.     Follow-up Disposition: Not on Fermin García MD

## 2017-08-21 NOTE — LETTER
NOTIFICATION RETURN TO WORK / SCHOOL 
 
8/21/2017 1:47 PM 
 
Ms. Delores Weiner [facility] [address] [facility] 83 [id number] To Whom It May Concern: 
 
Delores Weiner is currently under the care of Robbie Barfield. She will return to work/school on: 8/23/2017. If there are questions or concerns please have the patient contact our office.  
 
 
 
Sincerely, 
 
 
Lico Nesbitt MD

## 2018-02-01 ENCOUNTER — DOCUMENTATION ONLY (OUTPATIENT)
Dept: FAMILY MEDICINE CLINIC | Age: 33
End: 2018-02-01

## 2018-02-01 NOTE — LETTER
2/1/2018 Graciela Barkley [address] [facility] [phone] Dear Ms. Graciela Barkley, We had an appointment reserved for you 1/24/2018 and were concerned when you did not show or call within 24 hours to cancel the appointment. As mentioned in a previous letter to you, appointment time is limited and no-showed appointments may prevent another sick individual who needs to be seen from getting a preferred appointment time. Please note that a continued pattern of not showing for appointments may result in your inability to pre-book future appointments as well as possible discharge from the practice. Please call us at your earliest convenience to reschedule your appointment as your provider felt it was important to see you. Thank you for your anticipated cooperation. Sincerely, The scheduling staff [address] [place] [id number] [phone]

## 2019-12-25 ENCOUNTER — APPOINTMENT (OUTPATIENT)
Dept: GENERAL RADIOLOGY | Age: 34
End: 2019-12-25
Attending: PHYSICIAN ASSISTANT
Payer: MEDICAID

## 2019-12-25 ENCOUNTER — HOSPITAL ENCOUNTER (EMERGENCY)
Age: 34
Discharge: HOME | End: 2019-12-25
Attending: EMERGENCY MEDICINE | Admitting: EMERGENCY MEDICINE
Payer: MEDICAID

## 2019-12-25 VITALS
HEIGHT: 64 IN | SYSTOLIC BLOOD PRESSURE: 154 MMHG | BODY MASS INDEX: 47.8 KG/M2 | OXYGEN SATURATION: 99 % | TEMPERATURE: 99.6 F | HEART RATE: 73 BPM | DIASTOLIC BLOOD PRESSURE: 67 MMHG | RESPIRATION RATE: 18 BRPM | WEIGHT: 280 LBS

## 2019-12-25 DIAGNOSIS — R05.9 COUGH: ICD-10-CM

## 2019-12-25 DIAGNOSIS — R68.89 FLU-LIKE SYMPTOMS: Primary | ICD-10-CM

## 2019-12-25 LAB
FLUAV AG NPH QL IA: NEGATIVE
FLUBV AG NOSE QL IA: NEGATIVE

## 2019-12-25 PROCEDURE — 71046 X-RAY EXAM CHEST 2 VIEWS: CPT

## 2019-12-25 PROCEDURE — 87804 INFLUENZA ASSAY W/OPTIC: CPT

## 2019-12-25 PROCEDURE — 99282 EMERGENCY DEPT VISIT SF MDM: CPT

## 2019-12-25 RX ORDER — BENZONATATE 100 MG/1
100 CAPSULE ORAL
Qty: 20 CAP | Refills: 0 | Status: SHIPPED | OUTPATIENT
Start: 2019-12-25 | End: 2020-01-01

## 2019-12-25 NOTE — LETTER
[address] [facility] EMERGENCY DEPT 
[address] 
[address] [facility] [address] [phone] Work/School Note Date: 12/25/2019 To Whom It May concern: 
 
Sari Garcia was seen and treated today in the emergency room by the following provider(s): 
Attending Provider: Nuha Chi MD 
Physician Assistant: Rosangela Clarke PA-C. Sari Garcia may return to work on 12/28/2019 or sooner if symptoms markedly improve. Sincerely, Harry Anderson PA-C

## 2019-12-26 NOTE — DISCHARGE INSTRUCTIONS
Patient Education        Cough: Care Instructions  Your Care Instructions    A cough is your body's response to something that bothers your throat or airways. Many things can cause a cough. You might cough because of a cold or the flu, bronchitis, or asthma. Smoking, postnasal drip, allergies, and stomach acid that backs up into your throat also can cause coughs. A cough is a symptom, not a disease. Most coughs stop when the cause, such as a cold, goes away. You can take a few steps at home to cough less and feel better. Follow-up care is a key part of your treatment and safety. Be sure to make and go to all appointments, and call your doctor if you are having problems. It's also a good idea to know your test results and keep a list of the medicines you take. How can you care for yourself at home? · Drink lots of water and other fluids. This helps thin the mucus and soothes a dry or sore throat. Honey or lemon juice in hot water or tea may ease a dry cough. · Take cough medicine as directed by your doctor. · Prop up your head on pillows to help you breathe and ease a dry cough. · Try cough drops to soothe a dry or sore throat. Cough drops don't stop a cough. Medicine-flavored cough drops are no better than candy-flavored drops or hard candy. · Do not smoke. Avoid secondhand smoke. If you need help quitting, talk to your doctor about stop-smoking programs and medicines. These can increase your chances of quitting for good. When should you call for help? Call 911 anytime you think you may need emergency care.  For example, call if:    · You have severe trouble breathing.    Call your doctor now or seek immediate medical care if:    · You cough up blood.     · You have new or worse trouble breathing.     · You have a new or higher fever.     · You have a new rash.    Watch closely for changes in your health, and be sure to contact your doctor if:    · You cough more deeply or more often, especially if you notice more mucus or a change in the color of your mucus.     · You have new symptoms, such as a sore throat, an earache, or sinus pain.     · You do not get better as expected. Where can you learn more? Go to http://channing-palma.info/. Enter D279 in the search box to learn more about \"Cough: Care Instructions. \"  Current as of: June 9, 2019  Content Version: 12.2  © 0919-7157 [employer]. Care instructions adapted under license by [employer] (which disclaims liability or warranty for this information). If you have questions about a medical condition or this instruction, always ask your healthcare professional. [address] any warranty or liability for your use of this information.

## 2019-12-26 NOTE — ED PROVIDER NOTES
EMERGENCY DEPARTMENT HISTORY AND PHYSICAL EXAM    Date: 12/25/2019  Patient Name: Riky Jones    History of Presenting Illness     Chief Complaint   Patient presents with    Flu Like Symptoms         History Provided By: Patient    Chief Complaint: flu like symptoms  Duration: 1 Days  Timing:  Acute  Location: global  Quality: Aching  Severity: Moderate  Modifying Factors: none  Associated Symptoms: Cough, congestion, body aches, headache      HPI: Riky Jones is a 30 y.o. female with a PMH of No significant past medical history who presents to the ER complaining of flulike symptoms. Patient states her symptoms began yesterday suddenly and continued to worsen. She has been taking over-the-counter medications with minimal relief. She did not get the flu vaccine this year. She reports a productive cough with yellow and green phlegm. Patient reports an associated headache, body aches, cough congestion and just not feeling well. She is currently on her menses and has no concern for pregnancy. She has no other symptoms or complaints. PCP: None    Current Outpatient Medications   Medication Sig Dispense Refill    benzonatate (TESSALON PERLES) 100 mg capsule Take 1 Cap by mouth three (3) times daily as needed for Cough for up to 7 days. 20 Cap 0    ondansetron (ZOFRAN ODT) 4 mg disintegrating tablet Take 1 Tab by mouth every eight (8) hours as needed for Nausea. 20 Tab 0    ondansetron (ZOFRAN ODT) 4 mg disintegrating tablet Take 1 Tab by mouth every eight (8) hours as needed for Nausea. 30 Tab 0    losartan (COZAAR) 50 mg tablet Take 1 Tab by mouth daily. 30 Tab 3    LORazepam (ATIVAN) 0.5 mg tablet Take 1 Tab by mouth nightly. Max Daily Amount: 0.5 mg. 15 Tab 0    metFORMIN (GLUCOPHAGE) 500 mg tablet Take 1 Tab by mouth two (2) times daily (with meals). 60 Tab 3    prenatal multivit-ca-min-fe-fa tab Take  by mouth.          Past History     Past Medical History:  History reviewed. No pertinent past medical history. Past Surgical History:  Past Surgical History:   Procedure Laterality Date    HX  SECTION      2 done. one in  another in     HX OTHER SURGICAL  2012    right lung thotocentesis       Family History:  Family History   Problem Relation Age of Onset    Hypertension Mother     Asthma Maternal Aunt     Hypertension Maternal Grandmother     Stroke Maternal Grandmother        Social History:  Social History     Tobacco Use    Smoking status: Never Smoker    Smokeless tobacco: Never Used   Substance Use Topics    Alcohol use: Yes     Comment: ocassionally    Drug use: No       Allergies:  No Known Allergies      Review of Systems   Review of Systems   Constitutional: Positive for chills and fever. Negative for fatigue. HENT: Positive for congestion and rhinorrhea. Negative for sore throat. Eyes: Negative. Respiratory: Positive for cough. Negative for shortness of breath. Cardiovascular: Negative for chest pain and palpitations. Gastrointestinal: Negative for abdominal pain, nausea and vomiting. Genitourinary: Negative for dysuria. Musculoskeletal: Positive for myalgias. Skin: Negative. Neurological: Positive for headaches. Negative for dizziness, weakness and light-headedness. Psychiatric/Behavioral: Negative. All other systems reviewed and are negative. Physical Exam     Vitals:    19   BP: 154/67   Pulse: 73   Resp: 18   Temp: 99.6 °F (37.6 °C)   SpO2: 99%   Weight: 127 kg (280 lb)   Height: 5' 4\" (1.626 m)     Physical Exam  Vitals signs and nursing note reviewed. Constitutional:       General: She is not in acute distress. Appearance: Normal appearance. She is well-developed. HENT:      Head: Normocephalic and atraumatic. Right Ear: Tympanic membrane, ear canal and external ear normal.      Left Ear: Tympanic membrane, ear canal and external ear normal.      Nose: Congestion present. Mouth/Throat:      Mouth: Mucous membranes are moist.      Pharynx: Oropharynx is clear. Uvula midline. Posterior oropharyngeal erythema present. No pharyngeal swelling, oropharyngeal exudate or uvula swelling. Tonsils: No tonsillar exudate or tonsillar abscesses. Swellin+ on the right. 2+ on the left. Eyes:      General: No scleral icterus. Conjunctiva/sclera: Conjunctivae normal.      Pupils: Pupils are equal, round, and reactive to light. Neck:      Musculoskeletal: Normal range of motion and neck supple. Vascular: No JVD. Trachea: No tracheal deviation. Cardiovascular:      Rate and Rhythm: Normal rate and regular rhythm. Heart sounds: Normal heart sounds. No murmur. Pulmonary:      Effort: Pulmonary effort is normal. No respiratory distress. Breath sounds: Normal breath sounds. No stridor. No wheezing, rhonchi or rales. Abdominal:      Palpations: Abdomen is soft. Tenderness: There is no tenderness. Musculoskeletal:         General: No swelling or tenderness. Lymphadenopathy:      Cervical: No cervical adenopathy. Skin:     General: Skin is warm and dry. Capillary Refill: Capillary refill takes less than 2 seconds. Neurological:      Mental Status: She is alert and oriented to person, place, and time. GCS: GCS eye subscore is 4. GCS verbal subscore is 5. GCS motor subscore is 6. Gait: Gait normal.           Diagnostic Study Results     Labs -     Recent Results (from the past 12 hour(s))   INFLUENZA A & B AG (RAPID TEST)    Collection Time: 19  9:15 PM   Result Value Ref Range    Influenza A Antigen NEGATIVE  NEG      Influenza B Antigen NEGATIVE  NEG         Radiologic Studies -   XR CHEST PA LAT    (Results Pending)     CT Results  (Last 48 hours)    None        CXR Results  (Last 48 hours)    None            Medical Decision Making   I am the first provider for this patient.     I reviewed the vital signs, available nursing notes, past medical history, past surgical history, family history and social history. Vital Signs-Reviewed the patient's vital signs. Records Reviewed: Nursing Notes and Old Medical Records     526 PM  28-year-old female who presents the ER complaining of flulike symptoms that began 24 hours prior. Patient states taking over-the-counter medications with minimal relief. Productive cough with yellow phlegm. Did not get a flu vaccine. Unsure how high her fever has been running. Nontoxic in appearance on exam.  We will plan on flu swab and chest x-ray at this time. Ania Xie PA-C     10:25 PM  Chest x-ray with no acute process per my interpretation. Discussed results with mother. Flu swab negative. We will plan on medication for symptom relief. Patient's daughter noted test positive for flu some most likely exposure. Discussed strict return precautions and will have patient follow-up with primary care. Pt stable for discharge. All questions answered and patient in agreement with plan of care. Will plan for discharge. Ania Xie PA-C    Disposition:  discharged    DISCHARGE NOTE:       Care plan outlined and precautions discussed. Patient has no new complaints, changes, or physical findings. Results of labs, imaging were reviewed with the patient. All medications were reviewed with the patient; will d/c home with tessalon. All of pt's questions and concerns were addressed. Patient was instructed and agrees to follow up with pcp, as well as to return to the ED upon further deterioration. Patient is ready to go home.     Follow-up Information     Follow up With Specialties Details Why [facility] EMERGENCY DEPT Emergency Medicine  If symptoms worsen [id number]  [phone]    Your PCP  Call in 1 day As needed for ER follow up           Current Discharge Medication List      START taking these medications    Details   benzonatate (TESSALON PERLES) 100 mg capsule Take 1 Cap by mouth three (3) times daily as needed for Cough for up to 7 days. Qty: 20 Cap, Refills: 0         CONTINUE these medications which have NOT CHANGED    Details   !! ondansetron (ZOFRAN ODT) 4 mg disintegrating tablet Take 1 Tab by mouth every eight (8) hours as needed for Nausea. Qty: 20 Tab, Refills: 0      !! ondansetron (ZOFRAN ODT) 4 mg disintegrating tablet Take 1 Tab by mouth every eight (8) hours as needed for Nausea. Qty: 30 Tab, Refills: 0    Associated Diagnoses: Nausea      losartan (COZAAR) 50 mg tablet Take 1 Tab by mouth daily. Qty: 30 Tab, Refills: 3    Associated Diagnoses: Essential hypertension      LORazepam (ATIVAN) 0.5 mg tablet Take 1 Tab by mouth nightly. Max Daily Amount: 0.5 mg.  Qty: 15 Tab, Refills: 0    Associated Diagnoses: Anxiety      metFORMIN (GLUCOPHAGE) 500 mg tablet Take 1 Tab by mouth two (2) times daily (with meals). Qty: 60 Tab, Refills: 3    Associated Diagnoses: IFG (impaired fasting glucose); Morbid obesity due to excess calories ([facility] [address]      prenatal multivit-ca-min-fe-fa tab Take  by mouth. !! - Potential duplicate medications found. Please discuss with provider. Provider Notes (Medical Decision Making):     Procedures:  Procedures        Diagnosis     Clinical Impression:   1. Flu-like symptoms    2.  Cough

## 2019-12-26 NOTE — ED TRIAGE NOTES
PT [id number] reports cough congestion headache and body aches that started last night.  Took adult cold and flu med at 82

## 2021-02-01 ENCOUNTER — VIRTUAL VISIT (OUTPATIENT)
Dept: FAMILY MEDICINE CLINIC | Age: 36
End: 2021-02-01
Payer: MEDICAID

## 2021-02-01 DIAGNOSIS — F41.9 ANXIETY: ICD-10-CM

## 2021-02-01 DIAGNOSIS — R73.03 PREDIABETES: ICD-10-CM

## 2021-02-01 DIAGNOSIS — Z13.31 POSITIVE DEPRESSION SCREENING: Primary | ICD-10-CM

## 2021-02-01 PROCEDURE — 99214 OFFICE O/P EST MOD 30 MIN: CPT | Performed by: STUDENT IN AN ORGANIZED HEALTH CARE EDUCATION/TRAINING PROGRAM

## 2021-02-01 RX ORDER — ESCITALOPRAM OXALATE 10 MG/1
10 TABLET ORAL DAILY
Qty: 30 TAB | Refills: 0 | Status: SHIPPED | OUTPATIENT
Start: 2021-02-01 | End: 2021-04-13 | Stop reason: SDUPTHER

## 2021-02-01 NOTE — PROGRESS NOTES
Gabe Bella, who was evaluated through a synchronous (real-time) audio-video encounter, and/or her healthcare decision maker, is aware that it is a billable service, with coverage as determined by her insurance carrier. She provided verbal consent to proceed: Yes, and patient identification was verified. It was conducted pursuant to the emergency declaration under the [facility], [facility] and the [employer] Act. A caregiver was present when appropriate. Ability to conduct physical exam was limited. I was in the office. The patient was at home. History of Present Illness  Gabe Bella is a 37 y.o. female who presents today for management of    Chief Complaint   Patient presents with    Anxiety    Weight Management     280#       Patient is here to establish care. Previous PCP: Dr. Benjy Teixeira, patient has not been seen in practice for over 3 years. Patient would like to discuss her anxiety. Patient stated previously she had been placed on Ativan, but patient stated she never took this she did not like the feeling of it. Patient is a mother of 2 teenage sons and a 9year-old daughter. 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 She states sleep is one of her biggest issues. At this time she feels she would like to be started on something, since she feels she has been going through this for years already. Regarding weight loss patient stated that she was up to 293 and has come down to 280 which is she is right now. At this time she states that she is not taking any other medicine, even though in chart he states that she should be taking Metformin and losartan. Patient states that she sees OB/GYN at [facility]. PA's name who she sees is Chelsea Gutierrez.   She states she was recently there and her last blood pressure was 134/65. Past Medical History  No past medical history on file. Surgical History  Past Surgical History:   Procedure Laterality Date    HX  SECTION      2 done. one in  another in     HX OTHER SURGICAL  2012    right lung thotocentesis        Current Medications  Current Outpatient Medications   Medication Sig    ondansetron (ZOFRAN ODT) 4 mg disintegrating tablet Take 1 Tab by mouth every eight (8) hours as needed for Nausea.  ondansetron (ZOFRAN ODT) 4 mg disintegrating tablet Take 1 Tab by mouth every eight (8) hours as needed for Nausea.  losartan (COZAAR) 50 mg tablet Take 1 Tab by mouth daily.  LORazepam (ATIVAN) 0.5 mg tablet Take 1 Tab by mouth nightly. Max Daily Amount: 0.5 mg.    metFORMIN (GLUCOPHAGE) 500 mg tablet Take 1 Tab by mouth two (2) times daily (with meals).  prenatal multivit-ca-min-fe-fa tab Take  by mouth. No current facility-administered medications for this visit. Allergies/Drug Reactions  No Known Allergies     Family History  Family History   Problem Relation Age of Onset    Hypertension Mother     Asthma Maternal Aunt     Hypertension Maternal Grandmother     Stroke Maternal Grandmother         Social History  Social History     Tobacco Use    Smoking status: Never Smoker    Smokeless tobacco: Never Used   Substance Use Topics    Alcohol use: Yes     Comment: ocassionally    Drug use: No        Health Maintenance   Topic Date Due    PAP AKA CERVICAL CYTOLOGY  2008    A1C test (Diabetic or Prediabetic)  2018    Flu Vaccine (1) 2020    DTaP/Tdap/Td series (3 - Td) 2027    Pneumococcal 0-64 years  Aged [employer] History   Administered Date(s) Administered    TB Skin Test (PPD) Intradermal 2014       Review of Systems  Review of Systems   Constitutional: Negative for chills, fever and malaise/fatigue.    HENT: Negative for congestion, ear discharge and ear pain. Eyes: Negative for blurred vision, pain and discharge. Respiratory: Negative for cough and shortness of breath. Cardiovascular: Negative for chest pain and palpitations. Gastrointestinal: Negative for abdominal pain, nausea and vomiting. Genitourinary: Negative for dysuria, frequency and urgency. Skin: Negative for itching and rash. Neurological: Negative for dizziness, seizures, loss of consciousness and headaches. Psychiatric/Behavioral: Negative for substance abuse. The patient is nervous/anxious and has insomnia. Physical Exam  Vital signs: There were no vitals filed for this visit. Physical Exam  Constitutional:       Appearance: Normal appearance. Eyes:      General: No scleral icterus. Right eye: No discharge. Left eye: No discharge. Neck:      Musculoskeletal: Normal range of motion and neck supple. Pulmonary:      Effort: Pulmonary effort is normal. No respiratory distress. Neurological:      Mental Status: She is alert and oriented to person, place, and time. Cranial Nerves: No cranial nerve deficit. Psychiatric:         Mood and Affect: Mood normal.         Behavior: Behavior normal.         Thought Content: Thought content normal.         Judgment: Judgment normal.         Laboratory/Tests:      Assessment/Plan:    1. Positive depression screening  Depression screen positive, PHQ 9 Score: 16, patient instructed to schedule a follow-up visit at this practice, referral placed for depression evaluation and medication was prescribed, drug therapy education given - patient will call for any significant medication side effects or worsening symptoms of depression.  - REFERRAL TO PSYCHOLOGY  - escitalopram oxalate (LEXAPRO) 10 mg tablet; Take 1 Tab by mouth daily. Dispense: 30 Tab; Refill: 0    2. Prediabetes  - HEMOGLOBIN A1C WITH EAG; Future  - LIPID PANEL; Future    3.  Anxiety  - REFERRAL TO PSYCHOLOGY  - [facility] WITH AUTOMATED DIFF; Future  - METABOLIC PANEL, COMPREHENSIVE; Future  - TSH 3RD GENERATION; Future  - escitalopram oxalate (LEXAPRO) 10 mg tablet; Take 1 Tab by mouth daily. Dispense: 30 Tab; Refill: 0       Lab review: orders written for new lab studies as appropriate; see orders, no lab studies available for review at time of visit      I have discussed the diagnosis with the patient and the intended plan as seen in the above orders. Questions were answered concerning future plans. I have discussed medication side effects and warnings with the patient as well. I have reviewed the plan of care with the patient, accepted their input and they are in agreement with the treatment goals.        Hunter Glez MD  February 1, 2021

## 2021-02-01 NOTE — PROGRESS NOTES
Did not pass the depression screening. Matias [place] presents today for   Chief Complaint   Patient presents with    Anxiety    Weight Management     280#       Patient presents for Telehealth Medicine via . [employer]. me    Depression Screening:  3 most recent PHQ Screens 2/1/2021   PHQ Not Done -   Little interest or pleasure in doing things Nearly every day   Feeling down, depressed, irritable, or hopeless More than half the days   Total Score PHQ 2 5   Trouble falling or staying asleep, or sleeping too much More than half the days   Feeling tired or having little energy More than half the days   Poor appetite, weight loss, or overeating More than half the days   Feeling bad about yourself - or that you are a failure or have let yourself or your family down More than half the days   Trouble concentrating on things such as school, work, reading, or watching TV Nearly every day   Moving or speaking so slowly that other people could have noticed; or the opposite being so fidgety that others notice Not at all   Thoughts of being better off dead, or hurting yourself in some way Not at all   PHQ 9 Score 16       Learning Assessment:  Learning Assessment 11/26/2013   PRIMARY LEARNER Patient   PRIMARY LANGUAGE ENGLISH   LEARNER PREFERENCE PRIMARY DEMONSTRATION   ANSWERED BY patient   RELATIONSHIP SELF       Abuse Screening:  Abuse Screening Questionnaire 2/1/2021   Do you ever feel afraid of your partner? N   Are you in a relationship with someone who physically or mentally threatens you? N   Is it safe for you to go home? Y       Fall Risk  Fall Risk Assessment, last 12 mths 1/7/2014   Able to walk? Yes   Fall in past 12 months? No       Health Maintenance reviewed and discussed and ordered per Provider. Health Maintenance Due   Topic Date Due    PAP AKA CERVICAL CYTOLOGY  11/23/2008    A1C test (Diabetic or Prediabetic)  02/17/2018    Flu Vaccine (1) 09/01/2020   . Coordination of Care:  1. Have you been to the ER, urgent care clinic since your last visit? Hospitalized since your last visit? no    2. Have you seen or consulted any other health care providers outside of the [address] [place] [address] since your last visit? Include any pap smears or colon screening. no      Last  Checked no  Last UDS Checked no  Last Pain contract signed: no    Consent:  She and/or health care decision maker is aware that that she may receive a bill for this telephone service, depending on her insurance coverage, and has provided verbal consent to proceed:  Yes

## 2021-02-15 ENCOUNTER — VIRTUAL VISIT (OUTPATIENT)
Dept: FAMILY MEDICINE CLINIC | Age: 36
End: 2021-02-15
Payer: MEDICAID

## 2021-02-15 DIAGNOSIS — F41.9 ANXIETY: Primary | ICD-10-CM

## 2021-02-15 DIAGNOSIS — F34.1 DYSTHYMIA: ICD-10-CM

## 2021-02-15 PROCEDURE — 99213 OFFICE O/P EST LOW 20 MIN: CPT | Performed by: STUDENT IN AN ORGANIZED HEALTH CARE EDUCATION/TRAINING PROGRAM

## 2021-02-15 NOTE — PROGRESS NOTES
Jennifer Harry is a 37 y.o.  female and presents with    Chief Complaint   Patient presents with    Depression       Jennifer Harry, who was evaluated through a synchronous (real-time) audio-video encounter, and/or her healthcare decision maker, is aware that it is a billable service, with coverage as determined by her insurance carrier. She provided verbal consent to proceed: Yes, and patient identification was verified. It was conducted pursuant to the emergency declaration under the [facility], [address] [place] [address] authority and the [employer] Act. A caregiver was present when appropriate. Ability to conduct physical exam was limited. I was in the office. The patient was at home. Subjective:  Patient is here to follow-up on her depression and anxiety. States that since she began taking Lexapro she has been feeling better. She realizes that certain things that in the past is to make her very anxious are very manageable at this point. Patient states that she is also going through a break-up, and has noted that she was not handling well but now has been dealing with it better. She states that she does not feel like she has any side effects. She has noted that she has to take this when she is going to sleep, due to making her sleepy but is not affecting anything else at this time. Patient Active Problem List   Diagnosis Code    Morbid obesity ([facility] [address] E66.01    Snoring R06.83    IFG (impaired fasting glucose) R73.01    Threatened miscarriage O20.0    Elevated BP [id number]    Anxiety F41.9      No past medical history on file. Past Surgical History:   Procedure Laterality Date    HX  SECTION      2 done.  one in  another in     HX OTHER SURGICAL  2012    right lung thotocentesis      Family History   Problem Relation Age of Onset    Hypertension Mother    Laurita Annetteradha Asthma Maternal Aunt     Hypertension Maternal Grandmother     Stroke Maternal Grandmother      Social History     Socioeconomic History    Marital status: SINGLE     Spouse name: Not on file    Number of children: Not on file    Years of education: Not on file    Highest education level: Not on file   Occupational History    Not on file   Social Needs    Financial resource strain: Not on file    Food insecurity     Worry: Not on file     Inability: Not on file    Transportation needs     Medical: Not on file     Non-medical: Not on file   Tobacco Use    Smoking status: Never Smoker    Smokeless tobacco: Never Used   Substance and Sexual Activity    Alcohol use: Yes     Comment: ocassionally    Drug use: No    Sexual activity: Yes     Partners: Male     Birth control/protection: Condom     Comment: states that she uses protection sometimes   Lifestyle    Physical activity     Days per week: Not on file     Minutes per session: Not on file    Stress: Not on file   Relationships    Social connections     Talks on phone: Not on file     Gets together: Not on file     Attends Islam service: Not on file     Active member of club or organization: Not on file     Attends meetings of clubs or organizations: Not on file     Relationship status: Not on file    Intimate partner violence     Fear of current or ex partner: Not on file     Emotionally abused: Not on file     Physically abused: Not on file     Forced sexual activity: Not on file   Other Topics Concern    Not on file   Social History Narrative    Not on file        Current Outpatient Medications   Medication Sig Dispense Refill    escitalopram oxalate (LEXAPRO) 10 mg tablet Take 1 Tab by mouth daily. 30 Tab 0    ondansetron (ZOFRAN ODT) 4 mg disintegrating tablet Take 1 Tab by mouth every eight (8) hours as needed for Nausea. 20 Tab 0    prenatal multivit-ca-min-fe-fa tab Take  by mouth.           ROS   Review of Systems   Constitutional: Negative for chills, fever and malaise/fatigue. HENT: Negative for congestion, ear discharge and ear pain. Eyes: Negative for blurred vision, pain and discharge. Respiratory: Negative for cough and shortness of breath. Cardiovascular: Negative for chest pain and palpitations. Gastrointestinal: Negative for abdominal pain, nausea and vomiting. Genitourinary: Negative for dysuria, frequency and urgency. Skin: Negative for itching and rash. Neurological: Negative for dizziness, seizures, loss of consciousness and headaches. Psychiatric/Behavioral: Negative for substance abuse. The patient is nervous/anxious (improving). Objective: There were no vitals filed for this visit. Physical Exam  Constitutional:       Appearance: Normal appearance. Eyes:      General: No scleral icterus. Right eye: No discharge. Left eye: No discharge. Neck:      Musculoskeletal: Normal range of motion and neck supple. Pulmonary:      Effort: Pulmonary effort is normal. No respiratory distress. Neurological:      Mental Status: She is alert and oriented to person, place, and time. Cranial Nerves: No cranial nerve deficit. Psychiatric:         Mood and Affect: Mood normal.         Behavior: Behavior normal.         Thought Content: Thought content normal.         Judgment: Judgment normal.           LABS     TESTS      Assessment/Plan:    1. Anxiety  Patient's appearance seems improved from previous appointment. State that she is doing well on the Lexapro though she is at, however she will still like to meet back up in 2 weeks and see if she is where she should be at. 2. Dysthymia  Improved. We will continue present dose of Lexapro.        Lab review: no lab studies available for review at time of visit    On this date 02/15/21 I have spent 25 minutes reviewing previous notes, test results and face to face (virtual) with the patient discussing the diagnosis and importance of compliance with the treatment plan as well as documenting on the day of the visit. I have discussed the diagnosis with the patient and the intended plan as seen in the above orders. Questions were answered concerning future plans. I have discussed medication side effects and warnings with the patient as well. I have reviewed the plan of care with the patient, accepted their input and they are in agreement with the treatment goals.          Vernon Gutierrez MD

## 2021-03-05 LAB
ALBUMIN SERPL-MCNC: 4.1 G/DL (ref 3.8–4.8)
ALBUMIN/GLOB SERPL: 1.4 {RATIO} (ref 1.2–2.2)
ALP SERPL-CCNC: 68 IU/L (ref 39–117)
ALT SERPL-CCNC: 15 IU/L (ref 0–32)
AST SERPL-CCNC: 20 IU/L (ref 0–40)
BASOPHILS # BLD AUTO: 0 X10E3/UL (ref 0–0.2)
BASOPHILS NFR BLD AUTO: 0 %
BILIRUB SERPL-MCNC: <0.2 MG/DL (ref 0–1.2)
BUN SERPL-MCNC: 16 MG/DL (ref 6–20)
BUN/CREAT SERPL: 16 (ref 9–23)
CALCIUM SERPL-MCNC: 8.8 MG/DL (ref 8.7–10.2)
CHLORIDE SERPL-SCNC: 104 MMOL/L (ref 96–106)
CHOLEST SERPL-MCNC: 152 MG/DL (ref 100–199)
CO2 SERPL-SCNC: 20 MMOL/L (ref 20–29)
CREAT SERPL-MCNC: 0.99 MG/DL (ref 0.57–1)
EOSINOPHIL # BLD AUTO: 0.1 X10E3/UL (ref 0–0.4)
EOSINOPHIL NFR BLD AUTO: 1 %
ERYTHROCYTE [DISTWIDTH] IN BLOOD BY AUTOMATED COUNT: 13.2 % (ref 11.7–15.4)
EST. AVERAGE GLUCOSE BLD GHB EST-MCNC: 117 MG/DL
GLOBULIN SER CALC-MCNC: 2.9 G/DL (ref 1.5–4.5)
GLUCOSE SERPL-MCNC: 90 MG/DL (ref 65–99)
HBA1C MFR BLD: 5.7 % (ref 4.8–5.6)
HCT VFR BLD AUTO: 37.6 % (ref 34–46.6)
HDLC SERPL-MCNC: 47 MG/DL
HGB BLD-MCNC: 12.3 G/DL (ref 11.1–15.9)
IMM GRANULOCYTES # BLD AUTO: 0 X10E3/UL (ref 0–0.1)
IMM GRANULOCYTES NFR BLD AUTO: 0 %
LDLC SERPL CALC-MCNC: 89 MG/DL (ref 0–99)
LYMPHOCYTES # BLD AUTO: 1.5 X10E3/UL (ref 0.7–3.1)
LYMPHOCYTES NFR BLD AUTO: 26 %
MCH RBC QN AUTO: 28.6 PG (ref 26.6–33)
MCHC RBC AUTO-ENTMCNC: 32.7 G/DL (ref 31.5–35.7)
MCV RBC AUTO: 87 FL (ref 79–97)
MONOCYTES # BLD AUTO: 0.8 X10E3/UL (ref 0.1–0.9)
MONOCYTES NFR BLD AUTO: 13 %
NEUTROPHILS # BLD AUTO: 3.6 X10E3/UL (ref 1.4–7)
NEUTROPHILS NFR BLD AUTO: 60 %
PLATELET # BLD AUTO: 207 X10E3/UL (ref 150–450)
POTASSIUM SERPL-SCNC: 4.2 MMOL/L (ref 3.5–5.2)
PROT SERPL-MCNC: 7 G/DL (ref 6–8.5)
RBC # BLD AUTO: 4.3 X10E6/UL (ref 3.77–5.28)
SODIUM SERPL-SCNC: 139 MMOL/L (ref 134–144)
TRIGL SERPL-MCNC: 84 MG/DL (ref 0–149)
TSH SERPL DL<=0.005 MIU/L-ACNC: 2.9 UIU/ML (ref 0.45–4.5)
VLDLC SERPL CALC-MCNC: 16 MG/DL (ref 5–40)
WBC # BLD AUTO: 6 X10E3/UL (ref 3.4–10.8)

## 2021-04-13 ENCOUNTER — VIRTUAL VISIT (OUTPATIENT)
Dept: FAMILY MEDICINE CLINIC | Age: 36
End: 2021-04-13
Payer: MEDICAID

## 2021-04-13 DIAGNOSIS — E66.01 MORBID OBESITY (HCC): Primary | ICD-10-CM

## 2021-04-13 DIAGNOSIS — F41.9 ANXIETY: ICD-10-CM

## 2021-04-13 PROCEDURE — 99214 OFFICE O/P EST MOD 30 MIN: CPT | Performed by: STUDENT IN AN ORGANIZED HEALTH CARE EDUCATION/TRAINING PROGRAM

## 2021-04-13 RX ORDER — ESCITALOPRAM OXALATE 10 MG/1
10 TABLET ORAL DAILY
Qty: 90 TAB | Refills: 1 | Status: SHIPPED | OUTPATIENT
Start: 2021-04-13 | End: 2022-08-23 | Stop reason: SDUPTHER

## 2021-04-13 NOTE — PROGRESS NOTES
Igor Sung is a 37 y.o.  female and presents with    Chief Complaint   Patient presents with    Medication Refill    Weight Management     Igor Sung, who was evaluated through a synchronous (real-time) audio-video encounter, and/or her healthcare decision maker, is aware that it is a billable service, with coverage as determined by her insurance carrier. She provided verbal consent to proceed: Yes, and patient identification was verified. This visit was conducted pursuant to the emergency declaration under the [facility] [address] and the [employer] Act. A caregiver was present when appropriate. Ability to conduct physical exam was limited. The patient was located in a state where the provider was credentialed to provide care. --Jocelyn Garcia MD on 4/13/2021 at 11:46 AM      Subjective:  Patient would like a medication refill for her Lexapro. States that she has been doing well on this medicine. Denies any side effects from this. Does feel like her mood has improved. Patient would like to discuss also about her weight gain. States that she has tried doing things in order to have weight loss. Patient states that she does not eat a lot. [facility]. Finally patient would like a referral to sleep medicine. States that in the past she was told that she had possible sleep apnea. Possibility of having had a sleep study. Feels like she continues to have morning headaches, and feel tired during the day. Has been told that she snores loudly and has episodes of stop breathing at night.       Patient Active Problem List   Diagnosis Code    Morbid obesity ([facility] [address] E66.01    Snoring R06.83    IFG (impaired fasting glucose) R73.01    Threatened miscarriage O20.0    Elevated BP [id number]    Anxiety F41.9      No past medical history on file. Past Surgical History:   Procedure Laterality Date    HX  SECTION      2 done.  one in  another in     HX OTHER SURGICAL  2012    right lung thotocentesis      Family History   Problem Relation Age of Onset    Hypertension Mother     Asthma Maternal Aunt     Hypertension Maternal Grandmother     Stroke Maternal Grandmother      Social History     Socioeconomic History    Marital status: SINGLE     Spouse name: Not on file    Number of children: Not on file    Years of education: Not on file    Highest education level: Not on file   Occupational History    Not on file   Social Needs    Financial resource strain: Not on file    Food insecurity     Worry: Not on file     Inability: Not on file    Transportation needs     Medical: Not on file     Non-medical: Not on file   Tobacco Use    Smoking status: Never Smoker    Smokeless tobacco: Never Used   Substance and Sexual Activity    Alcohol use: Yes     Comment: ocassionally    Drug use: No    Sexual activity: Yes     Partners: Male     Birth control/protection: Condom     Comment: states that she uses protection sometimes   Lifestyle    Physical activity     Days per week: Not on file     Minutes per session: Not on file    Stress: Not on file   Relationships    Social connections     Talks on phone: Not on file     Gets together: Not on file     Attends Gnosticist service: Not on file     Active member of club or organization: Not on file     Attends meetings of clubs or organizations: Not on file     Relationship status: Not on file    Intimate partner violence     Fear of current or ex partner: Not on file     Emotionally abused: Not on file     Physically abused: Not on file     Forced sexual activity: Not on file   Other Topics Concern    Not on file   Social History Narrative    Not on file        Current Outpatient Medications   Medication Sig Dispense Refill    escitalopram oxalate (LEXAPRO) 10 mg tablet Take 1 Tab by mouth daily. 90 Tab 1    ondansetron (ZOFRAN ODT) 4 mg disintegrating tablet Take 1 Tab by mouth every eight (8) hours as needed for Nausea. 20 Tab 0    prenatal multivit-ca-min-fe-fa tab Take  by mouth. ROS   Review of Systems   Constitutional: Negative for chills, fever and malaise/fatigue. HENT: Negative for congestion, ear discharge and ear pain. Eyes: Negative for blurred vision, pain and discharge. Respiratory: Negative for cough and shortness of breath. Cardiovascular: Negative for chest pain and palpitations. Gastrointestinal: Negative for abdominal pain, nausea and vomiting. Genitourinary: Negative for dysuria, frequency and urgency. Skin: Negative for itching and rash. Neurological: Negative for dizziness, seizures, loss of consciousness and headaches. Psychiatric/Behavioral: Negative for substance abuse. Objective: There were no vitals filed for this visit. Physical Exam  Constitutional:       Appearance: Normal appearance. She is obese. Eyes:      General: No scleral icterus. Right eye: No discharge. Left eye: No discharge. Neck:      Musculoskeletal: Normal range of motion and neck supple. Pulmonary:      Effort: Pulmonary effort is normal. No respiratory distress. Neurological:      Mental Status: She is alert and oriented to person, place, and time. Cranial Nerves: No cranial nerve deficit. Psychiatric:         Mood and Affect: Mood normal.         Behavior: Behavior normal.         Thought Content: Thought content normal.         Judgment: Judgment normal.           LABS     TESTS      Assessment/Plan:    1. Anxiety  stable  - escitalopram oxalate (LEXAPRO) 10 mg tablet; Take 1 Tab by mouth daily. Dispense: 90 Tab; Refill: 1    2.  Morbid obesity ([address]  STOP BANG-4  - REFERRAL TO NUTRITION  - SLEEP MEDICINE REFERRAL      Lab review: no lab studies available for review at time of visit      I have discussed the diagnosis with the patient and the intended plan as seen in the above orders. The patient has received an after-visit summary and questions were answered concerning future plans. I have discussed medication side effects and warnings with the patient as well. I have reviewed the plan of care with the patient, accepted their input and they are in agreement with the treatment goals.          Prasanna Beltran MD

## 2021-04-13 NOTE — LETTER
April 13, 2021 Moises Casper [address] [place] [address] [phone] Dear Lexi Rucker: Thank you for requesting access to [employer]. Please follow the instructions below to securely access and download your online medical record. [employer] allows you to send messages to your doctor, view your test results, renew your prescriptions, schedule appointments, and more. How Do I Sign Up? 1. In your internet browser, go to [email or web address]. 2. Click on the First Time User? Click Here link in the Sign In box. You will see the New Member Sign Up page. 3. Enter your [employer] Access Code exactly as it appears below. You will not need to use this code after youve completed the sign-up process. If you do not sign up before the expiration date, you must request a new code. [employer] Access Code: Activation code not generated Current [employer] Status: Active 4. Enter the last four digits of your Social Security Number (xxxx) and Date of Birth (mm/dd/yyyy) as indicated and click Submit. You will be taken to the next sign-up page. 5. Create a [employer] ID. This will be your [employer] login ID and cannot be changed, so think of one that is secure and easy to remember. 6. Create a [employer] password. You can change your password at any time. 7. Enter your Password Reset Question and Answer. This can be used at a later time if you forget your password. 8. Enter your e-mail address. You will receive e-mail notification when new information is available in [address]. 9. Click Sign Up. You can now view and download portions of your medical record. 10. Click the Download Summary menu link to download a portable copy of your medical information. Additional Information If you have questions, please visit the Frequently Asked Questions section of the [employer] website at [email or web address] Remember, [employer] is NOT to be used for urgent needs.  For medical emergencies, dial 911. Now available from your iPhone and Android! Sincerely, [email or web address]

## 2022-08-23 ENCOUNTER — OFFICE VISIT (OUTPATIENT)
Dept: FAMILY MEDICINE CLINIC | Age: 37
End: 2022-08-23
Payer: MEDICAID

## 2022-08-23 VITALS
SYSTOLIC BLOOD PRESSURE: 138 MMHG | TEMPERATURE: 98 F | HEART RATE: 100 BPM | BODY MASS INDEX: 50.02 KG/M2 | OXYGEN SATURATION: 98 % | RESPIRATION RATE: 16 BRPM | WEIGHT: 293 LBS | HEIGHT: 64 IN | DIASTOLIC BLOOD PRESSURE: 85 MMHG

## 2022-08-23 DIAGNOSIS — R06.89 BREATHING DIFFICULTY: ICD-10-CM

## 2022-08-23 DIAGNOSIS — N93.9 ABNORMAL UTERINE BLEEDING (AUB): Primary | ICD-10-CM

## 2022-08-23 DIAGNOSIS — Z01.818 PREOP EXAMINATION: ICD-10-CM

## 2022-08-23 DIAGNOSIS — F41.9 ANXIETY: ICD-10-CM

## 2022-08-23 DIAGNOSIS — R73.03 PREDIABETES: ICD-10-CM

## 2022-08-23 LAB — HBA1C MFR BLD HPLC: 5.8 %

## 2022-08-23 PROCEDURE — 99214 OFFICE O/P EST MOD 30 MIN: CPT | Performed by: STUDENT IN AN ORGANIZED HEALTH CARE EDUCATION/TRAINING PROGRAM

## 2022-08-23 PROCEDURE — 83036 HEMOGLOBIN GLYCOSYLATED A1C: CPT | Performed by: STUDENT IN AN ORGANIZED HEALTH CARE EDUCATION/TRAINING PROGRAM

## 2022-08-23 RX ORDER — ONDANSETRON 4 MG/1
4 TABLET, ORALLY DISINTEGRATING ORAL
Qty: 20 TABLET | Refills: 0 | Status: SHIPPED | OUTPATIENT
Start: 2022-08-23

## 2022-08-23 RX ORDER — BUTALBITAL, ACETAMINOPHEN AND CAFFEINE 50; 325; 40 MG/1; MG/1; MG/1
1 TABLET ORAL
COMMUNITY
Start: 2022-03-29

## 2022-08-23 RX ORDER — ESCITALOPRAM OXALATE 10 MG/1
10 TABLET ORAL DAILY
Qty: 90 TABLET | Refills: 1 | Status: SHIPPED | OUTPATIENT
Start: 2022-08-23

## 2022-08-23 RX ORDER — LOSARTAN POTASSIUM 50 MG/1
50 TABLET ORAL DAILY
COMMUNITY
Start: 2022-03-29

## 2022-08-23 NOTE — PROGRESS NOTES
Maribel Lopez is a 31 y.o. presents today for   Chief Complaint   Patient presents with    Pre-op Exam     9/7/22 Hysterectomy [address]     Headache     Hx migraines      Weight Gain     20lb last year      Is someone accompanying this pt? No    Is the patient using any DME equipment during OV? No      Depression Screening:   3 most recent PHQ Screens 8/23/2022   PHQ Not Done -   Little interest or pleasure in doing things Not at all   Feeling down, depressed, irritable, or hopeless Not at all   Total Score PHQ 2 0   Trouble falling or staying asleep, or sleeping too much -   Feeling tired or having little energy -   Poor appetite, weight loss, or overeating -   Feeling bad about yourself - or that you are a failure or have let yourself or your family down -   Trouble concentrating on things such as school, work, reading, or watching TV -   Moving or speaking so slowly that other people could have noticed; or the opposite being so fidgety that others notice -   Thoughts of being better off dead, or hurting yourself in some way -   PHQ 9 Score -       Health Maintenance: reviewed and discussed and ordered per Provider. Health Maintenance Due   Topic Date Due    Cervical cancer screen  Never done    COVID-19 Vaccine (3 - Booster for Pfizer series) 07/18/2021    A1C test (Diabetic or Prediabetic)  03/04/2022    Depression Monitoring  04/13/2022         Coordination of Care:   1. \"Have you been to the ER, urgent care clinic since your last visit? Hospitalized since your last visit? \" Yes, urgent care for dental abscess last month     2. \"Have you seen or consulted any other health care providers outside of the [address] [place] [address] since your last visit? \" Yes gynecology       3. For patients aged 39-70: Has the patient had a colonoscopy / FIT/ Cologuard? NA - based on age    If the patient is female:    4. For patients aged 41-77: Has the patient had a mammogram within the past 2 years?  NA - based on age or sex    5. For patients aged 21-65: Has the patient had a pap smear? Yes, 2 years ago     Advanced Directive:  1. Do you have an Advanced Directive? No     2. Would you like information on Advanced Directives?  No    Ana Landis CMA

## 2022-08-23 NOTE — PROGRESS NOTES
Chief Complaint:  Consult for pre-operative evaluation. History of Present Illness:  Ms. Jorge Luis Patel is a 31 y.o. female with past medical history significant for History reviewed. No pertinent past medical history. Joyce Boyer today for the pre-operative evaluation. Procedure to be performed: DAVINCI LAPAROSCOPIC HYSTERECTOMY TOTAL; SALPINGECTOMY BILATERAL  Procedure to be performed by: Dr. Luz Ovalles  Procedure to be performed at: [facility]  Procedure to be performed on: 2022    Has hx 3 c-sections. In February had a hysteroscopy w/ general anesthesia. Pt had no issues wit that then. Other complains today include: Pt needs refill on her Lexapro has not been consistent taking this. Has helped with her anxiety. No Known Allergies  History reviewed. No pertinent past medical history. Past Surgical History:   Procedure Laterality Date    HX  SECTION      2 done.  one in  another in     HX OTHER SURGICAL  2012    right lung thotocentesis     Family History   Problem Relation Age of Onset    Hypertension Mother     Asthma Maternal Aunt     Hypertension Maternal Grandmother     Stroke Maternal Grandmother      Social History     Socioeconomic History    Marital status: SINGLE     Spouse name: Not on file    Number of children: Not on file    Years of education: Not on file    Highest education level: Not on file   Occupational History    Not on file   Tobacco Use    Smoking status: Never    Smokeless tobacco: Never   Substance and Sexual Activity    Alcohol use: Yes     Comment: ocassionally    Drug use: No    Sexual activity: Yes     Partners: Male     Birth control/protection: Condom     Comment: states that she uses protection sometimes   Other Topics Concern    Not on file   Social History Narrative    Not on file     Social Determinants of Health     Financial Resource Strain: Not on file   Food Insecurity: Not on file   Transportation Needs: Not on file   Physical Activity: Not on file   Stress: Not on file   Social Connections: Not on file   Intimate Partner Violence: Not on file   Housing Stability: Not on file     Current Outpatient Medications   Medication Sig Dispense Refill    butalbital-acetaminophen-caffeine (FIORICET, ESGIC) -40 mg per tablet Take 1 Tablet by mouth every four (4) hours as needed. losartan (COZAAR) 50 mg tablet Take 50 mg by mouth daily. escitalopram oxalate (LEXAPRO) 10 mg tablet Take 1 Tab by mouth daily. 90 Tab 1    ondansetron (ZOFRAN ODT) 4 mg disintegrating tablet Take 1 Tab by mouth every eight (8) hours as needed for Nausea. 20 Tab 0    prenatal multivit-ca-min-fe-fa tab Take  by mouth. (Patient not taking: Reported on 8/23/2022)         ROS   Review of Systems   Constitutional:  Negative for chills, fever and malaise/fatigue. HENT:  Negative for congestion, ear discharge and ear pain. Eyes:  Negative for blurred vision, pain and discharge. Respiratory:  Negative for cough and shortness of breath. Cardiovascular:  Negative for chest pain and palpitations. Gastrointestinal:  Negative for abdominal pain, nausea and vomiting. Genitourinary:  Negative for dysuria, frequency and urgency. Skin:  Negative for itching and rash. Neurological:  Negative for dizziness, seizures, loss of consciousness and headaches. Psychiatric/Behavioral:  Negative for substance abuse. Objective:  Vitals:    08/23/22 1334 08/23/22 1344   BP: (!) 142/74 138/85   Pulse: 100    Resp: 16    Temp: 98 °F (36.7 °C)    TempSrc: Temporal    SpO2: 98%    Weight: 297 lb 9.6 oz (135 kg)    Height: 5' 4\" (1.626 m)    PainSc:   5    PainLoc: Head    LMP: 08/07/2022     Physical Exam  Vitals reviewed. Constitutional:       Appearance: Normal appearance. She is obese.    HENT:      Right Ear: Hearing, tympanic membrane, ear canal and external ear normal.      Left Ear: Hearing, tympanic membrane, ear canal and external ear normal.      Nose: No septal deviation, nasal tenderness, congestion or rhinorrhea. Right Turbinates: Enlarged. Left Turbinates: Enlarged. Eyes:      General: No scleral icterus. Right eye: No discharge. Left eye: No discharge. Cardiovascular:      Rate and Rhythm: Normal rate and regular rhythm. Pulses: Normal pulses. Pulmonary:      Effort: Pulmonary effort is normal. No respiratory distress. Breath sounds: Normal breath sounds. Musculoskeletal:         General: Normal range of motion. Cervical back: Normal range of motion and neck supple. Skin:     General: Skin is warm. Neurological:      Mental Status: She is alert and oriented to person, place, and time. Cranial Nerves: No cranial nerve deficit. Psychiatric:         Mood and Affect: Mood normal.         Behavior: Behavior normal.         Thought Content: Thought content normal.         Judgment: Judgment normal.         LABS     TESTS      Assessment/Plan:      1. Anxiety  - escitalopram oxalate (LEXAPRO) 10 mg tablet; Take 1 Tablet by mouth daily. Dispense: 90 Tablet; Refill: 1    2. Prediabetes  A1C - 5.8%  - AMB POC HEMOGLOBIN A1C    3. Breathing difficulty  PT is mouth breather. Feels like she is unable to use her nose to breath. Noted on physical exam  - REFERRAL TO ENT-OTOLARYNGOLOGY    4. Abnormal uterine bleeding (AUB)    5. Preop examination  Labs and EKG have been reviewed, and no acute pathology appreciated. This individual is at low risk for cardiovascular event during the surgery. Based on the Esaw Melchor criteria for cardiac risk (RCRI), the estimated risk of adverse outcome with non cardiac surgery is considered ClassI risk. The estimated rate of MI, pulmonary edema, ventricular fibrillation, cardiac arrest, or complete heart block is: 3.9%. Based on the Zettie Challenger perioperative cardiac risk, patient's estimated risk probability for perioperative myocardial infarct or cardiac arrest is: 0.1%.  Ms. Kristin Jacobs has the following risk factors : morbid obesity. She has been medically optimized for procedure. Ms. Felipe Cruz may proceed with surgery with no additional cardiac testing or procedures. I have discussed the diagnosis with the patient and the intended plan as seen in the above orders. The patient has received an after-visit summary and questions were answered concerning future plans. I have discussed medication side effects and warnings with the patient as well. I have reviewed the plan of care with the patient, accepted their input and they are in agreement with the treatment goals.          Tom Rae MD